# Patient Record
Sex: FEMALE | Race: BLACK OR AFRICAN AMERICAN | Employment: FULL TIME | URBAN - METROPOLITAN AREA
[De-identification: names, ages, dates, MRNs, and addresses within clinical notes are randomized per-mention and may not be internally consistent; named-entity substitution may affect disease eponyms.]

---

## 2017-04-21 ENCOUNTER — HOSPITAL ENCOUNTER (OUTPATIENT)
Dept: NUCLEAR MEDICINE | Age: 37
Discharge: HOME | End: 2017-04-21
Attending: INTERNAL MEDICINE
Payer: COMMERCIAL

## 2017-04-21 DIAGNOSIS — K59.00 CONSTIPATION: ICD-10-CM

## 2017-04-21 DIAGNOSIS — R10.9 ABDOMINAL PAIN: ICD-10-CM

## 2017-04-21 DIAGNOSIS — R19.07 ABDOMINAL SWELLING, GENERALIZED: ICD-10-CM

## 2017-04-21 DIAGNOSIS — R11.0 NAUSEA: ICD-10-CM

## 2017-04-21 DIAGNOSIS — R68.81 EARLY SATIETY: ICD-10-CM

## 2017-04-21 PROCEDURE — 78264 GASTRIC EMPTYING IMG STUDY: CPT

## 2019-02-15 ENCOUNTER — OFFICE VISIT (OUTPATIENT)
Dept: FAMILY MEDICINE CLINIC | Age: 39
End: 2019-02-15

## 2019-02-15 VITALS
DIASTOLIC BLOOD PRESSURE: 66 MMHG | WEIGHT: 141.8 LBS | OXYGEN SATURATION: 99 % | HEIGHT: 63 IN | SYSTOLIC BLOOD PRESSURE: 122 MMHG | HEART RATE: 68 BPM | BODY MASS INDEX: 25.12 KG/M2 | TEMPERATURE: 98.3 F | RESPIRATION RATE: 16 BRPM

## 2019-02-15 DIAGNOSIS — R51.9 HEADACHE DISORDER: Primary | ICD-10-CM

## 2019-02-15 DIAGNOSIS — R53.83 TIRED: ICD-10-CM

## 2019-02-15 DIAGNOSIS — R10.13 EPIGASTRIC PAIN: ICD-10-CM

## 2019-02-15 RX ORDER — FAMOTIDINE 20 MG/1
20 TABLET, FILM COATED ORAL 2 TIMES DAILY
Qty: 30 TAB | Refills: 1 | Status: SHIPPED | OUTPATIENT
Start: 2019-02-15 | End: 2019-03-07

## 2019-02-15 RX ORDER — SUMATRIPTAN 100 MG/1
100 TABLET, FILM COATED ORAL
Qty: 10 TAB | Refills: 2 | Status: SHIPPED | OUTPATIENT
Start: 2019-02-15 | End: 2019-02-15

## 2019-02-15 RX ORDER — FEXOFENADINE HCL 60 MG
60 TABLET ORAL DAILY
COMMUNITY
End: 2020-01-07

## 2019-02-15 RX ORDER — ONDANSETRON HYDROCHLORIDE 8 MG/1
8 TABLET, FILM COATED ORAL
Qty: 20 TAB | Refills: 0 | Status: SHIPPED | OUTPATIENT
Start: 2019-02-15 | End: 2021-10-27

## 2019-02-15 NOTE — PROGRESS NOTES
Chief Complaint   Patient presents with    Headache     Every day for the past 4 weeks, have been taking ibuprofen , but that has not help at all.  Nausea     happened with a severe migraine last week Friday.  Decreased Appetite     Lately, due to head pain. 1. Have you been to the ER, urgent care clinic since your last visit? Hospitalized since your last visit? No    2. Have you seen or consulted any other health care providers outside of the [address] [place] [address] since your last visit? Include any pap smears or colon screening.  Yes to

## 2019-02-15 NOTE — PROGRESS NOTES
HISTORY OF PRESENT ILLNESS  Alber Cedeño is a 45 y.o. female. HPI: Patient is complaining of headache x 3-4 weeks. It is associated with nausea and loss of appetite and fatigue. She has histroy of migraine headaches and chronic fatigue. She has been taking OTC motrin without help. She is also complaining of epigastric pain x 1 month, she describes pain as dull , doesn't know contributing or relieving factors. [facility] 3/10   Past Medical History:   Diagnosis Date    Abnormal Pap smear     2003-cryo, normal since    Cervical radiculitis, LUE Paresthesias and Pain 4/6/2016    Ortho Dr Maryjo Harding 2015, Pain Mgt Dr Ana Tineo , PT    Cervicalgia 4/6/2016    Ortho Dr Maryjo Harding 2015, Pain Mgt Dr Ana Tineo , PT    Chronic fatigue 7/7/2015    Since summer 2013, sleep eval 2014 negative    Chronic nausea 4/6/2016    GI Dr Quinones No: EGD and Colonoscopy 2-2016    Headache     Headache(784.0)     Mechanical back pain 5/21/2014    Since age 11 yo chronic LBP; Eval since [id number]: Dr Rina Cabello: plain films, MRI's, EMG studies, Neuro consult, PT on and off, dry needling 3/2014    Periodic limb movement disorder 7/7/2015    Sleep eval Dr Kacie Rao, 7/2014     Past Surgical History:   Procedure Laterality Date    HX BLADDER SUSPENSION  3/23/15    prolapse    HX PARTIAL HYSTERECTOMY  3/23/15    prolapse; cervix and ovaries intact     Allergies   Allergen Reactions    Bactrim [Sulfamethoprim Ds] Swelling     Swollen tongue    Codeine Rash   '  Current Outpatient Medications:     fexofenadine (ALLEGRA) 60 mg tablet, Take  by mouth., Disp: , Rfl:     OTHER, Allergy injection every 2 weeks. , Disp: , Rfl:     SUMAtriptan (IMITREX) 100 mg tablet, Take 1 Tab by mouth once as needed for Migraine for up to 1 dose., Disp: 10 Tab, Rfl: 2    ondansetron hcl (ZOFRAN) 8 mg tablet, Take 1 Tab by mouth every eight (8) hours as needed for Nausea., Disp: 20 Tab, Rfl: 0    famotidine (PEPCID) 20 mg tablet, Take 1 Tab by mouth two (2) times a day., Disp: 30 Tab, Rfl: 1    MULTIVIT WITH CALCIUM,IRON,MIN (WOMEN'S DAILY MULTIVITAMIN PO), Take  by mouth., Disp: , Rfl:     methylPREDNISolone (MEDROL DOSEPACK) 4 mg tablet, Recently per [facility] for allergic reaction, Disp: , Rfl:     loratadine (CLARITIN) 10 mg tablet, Take 10 mg by mouth daily as needed for Allergies. , Disp: , Rfl:   Review of Systems   Constitutional: Negative. Eyes: Negative. Respiratory: Negative. Cardiovascular: Negative. Gastrointestinal: Positive for heartburn and nausea. Neurological: Positive for headaches. Blood pressure 122/66, pulse 68, temperature 98.3 °F (36.8 °C), temperature source Oral, resp. rate 16, height 5' 3\" (1.6 m), weight 141 lb 12.8 oz (64.3 kg), last menstrual period 02/03/2015, SpO2 99 %. Physical Exam   Constitutional: She is oriented to person, place, and time. No distress. HENT:   Mouth/Throat: Oropharynx is clear and moist.   Neck: Normal range of motion. Neck supple. Cardiovascular: Normal rate and regular rhythm. No murmur heard. Pulmonary/Chest: Effort normal and breath sounds normal.   Abdominal: Soft. Bowel sounds are normal.   Neurological: She is alert and oriented to person, place, and time. She has normal reflexes. Nursing note and vitals reviewed. ASSESSMENT and PLAN  Diagnoses and all orders for this visit:    1. Headache disorder  -     SUMAtriptan (IMITREX) 100 mg tablet; Take 1 Tab by mouth once as needed for Migraine for up to 1 dose. -     ondansetron hcl (ZOFRAN) 8 mg tablet; Take 1 Tab by mouth every eight (8) hours as needed for Nausea. 2. Epigastric pain  -     famotidine (PEPCID) 20 mg tablet; Take 1 Tab by mouth two (2) times a day.     3. Tired  -     METABOLIC PANEL, BASIC  -     CBC W/O DIFF  -     T4, FREE  -     TSH 3RD GENERATION  Pt was given an after visit summary which includes diagnosis, current medicines and vital and voiced understanding of treatment plan

## 2019-02-16 LAB
BUN SERPL-MCNC: 12 MG/DL (ref 6–20)
BUN/CREAT SERPL: 14 (ref 9–23)
CALCIUM SERPL-MCNC: 8.8 MG/DL (ref 8.7–10.2)
CHLORIDE SERPL-SCNC: 105 MMOL/L (ref 96–106)
CO2 SERPL-SCNC: 22 MMOL/L (ref 20–29)
CREAT SERPL-MCNC: 0.83 MG/DL (ref 0.57–1)
ERYTHROCYTE [DISTWIDTH] IN BLOOD BY AUTOMATED COUNT: 14.9 % (ref 12.3–15.4)
GLUCOSE SERPL-MCNC: 75 MG/DL (ref 65–99)
HCT VFR BLD AUTO: 37.4 % (ref 34–46.6)
HGB BLD-MCNC: 12.4 G/DL (ref 11.1–15.9)
MCH RBC QN AUTO: 27 PG (ref 26.6–33)
MCHC RBC AUTO-ENTMCNC: 33.2 G/DL (ref 31.5–35.7)
MCV RBC AUTO: 82 FL (ref 79–97)
PLATELET # BLD AUTO: 206 X10E3/UL (ref 150–379)
POTASSIUM SERPL-SCNC: 4.4 MMOL/L (ref 3.5–5.2)
RBC # BLD AUTO: 4.59 X10E6/UL (ref 3.77–5.28)
SODIUM SERPL-SCNC: 140 MMOL/L (ref 134–144)
T4 FREE SERPL-MCNC: 1.12 NG/DL (ref 0.82–1.77)
TSH SERPL DL<=0.005 MIU/L-ACNC: 1.59 UIU/ML (ref 0.45–4.5)
WBC # BLD AUTO: 3.6 X10E3/UL (ref 3.4–10.8)

## 2019-02-18 ENCOUNTER — TELEPHONE (OUTPATIENT)
Dept: FAMILY MEDICINE CLINIC | Age: 39
End: 2019-02-18

## 2019-02-18 NOTE — TELEPHONE ENCOUNTER
USMAN Cervantes LPN   Caller: Unspecified (Today, 12:01 PM)             I did prescribed Imitrex but I doesn't see on chart either   I changed it to Excedrin migraine she can take 1 tab as needed to headaches

## 2019-03-07 ENCOUNTER — OFFICE VISIT (OUTPATIENT)
Dept: FAMILY MEDICINE CLINIC | Age: 39
End: 2019-03-07

## 2019-03-07 VITALS
SYSTOLIC BLOOD PRESSURE: 104 MMHG | OXYGEN SATURATION: 97 % | WEIGHT: 139.2 LBS | BODY MASS INDEX: 24.66 KG/M2 | DIASTOLIC BLOOD PRESSURE: 62 MMHG | HEIGHT: 63 IN | HEART RATE: 72 BPM | TEMPERATURE: 98 F | RESPIRATION RATE: 18 BRPM

## 2019-03-07 DIAGNOSIS — Z00.00 ROUTINE GENERAL MEDICAL EXAMINATION AT A HEALTH CARE FACILITY: Primary | ICD-10-CM

## 2019-03-07 PROBLEM — M26.629 TMJ SYNDROME: Status: ACTIVE | Noted: 2019-02-01

## 2019-03-07 PROBLEM — Z91.018 MULTIPLE FOOD ALLERGIES: Status: ACTIVE | Noted: 2019-03-07

## 2019-03-07 PROBLEM — Z91.09 MULTIPLE ENVIRONMENTAL ALLERGIES: Status: ACTIVE | Noted: 2019-03-07

## 2019-03-07 RX ORDER — EPINEPHRINE 0.3 MG/.3ML
0.3 INJECTION SUBCUTANEOUS
COMMUNITY
End: 2021-10-27

## 2019-03-07 NOTE — PROGRESS NOTES
Chief Complaint   Patient presents with    Complete Physical     not fasting - has gyn        1. Have you been to the ER, urgent care clinic since your last visit? Hospitalized since your last visit? No    2. Have you seen or consulted any other health care providers outside of the [address] [place] [address] since your last visit? Include any pap smears or colon screening.    Yes [facility]

## 2019-03-07 NOTE — PROGRESS NOTES
Chief Complaint   Patient presents with    Complete Physical     not fasting - has gyn        HISTORY OF PRESENT ILLNESS   HPI  Patient presents for general checkup. I have not seen her for a few years but she has been to [facility] for various acute, mild illnesses and the past few months has been seeing her allergist, dentist and an ENT for multiple issues. She has h/o multiple environmental and food allergies and started on allergy shots about 2 yrs ago. She also has been told by her dentist over the years that she \"probably has TMJ\", but states that dx was finally confirmed recently and they are in the process of custom made mouth guard. She also recently saw an ENT about nasal issues/TMJ/headaches and they prescribed a steroid nasal spray, recommended Topamax for the chronic headaches and referred her to Neurology since she had not seen one in a while. She is scheduled to see Rachele Adames next month. She will be starting on the spray and Topamax until then. She states aside from the TMJ and chronic migraine headaches she had been feeling great and like she has been in good health in general. She is very happy socially/emotionally. Enjoys being w/ her  and 2 young children as well. REVIEW OF SYMPTOMS     Review of Systems   Constitutional: Negative. Eyes: Negative. Respiratory: Negative. Cardiovascular: Negative. Gastrointestinal: Negative. Genitourinary: Negative.     Psychiatric/Behavioral: Negative.            PROBLEM LIST/MEDICAL HISTORY      Problem List  Date Reviewed: 3/7/2019          Codes Class Noted    Multiple food allergies ICD-10-CM: Z91.018  ICD-9-CM: V15.05  3/7/2019    Overview Signed 3/7/2019  2:58 PM by MD Chasity Garcia Dr; coconut, seafood, corn, several fruits             Multiple environmental allergies ICD-10-CM: Z91.09  ICD-9-CM: V15.09  3/7/2019    Overview Signed 3/7/2019  2:58 PM by Malachi Umaña MD Chasity Pritchett, Immunotherapy since ~ 2017             TMJ syndrome ICD-10-CM: L22.229  ICD-9-CM: 524.69  2/1/2019    Overview Addendum 3/7/2019  3:04 PM by Malachi Umaña MD     [phone] Dr Dimitrios Freeman; Dentist custom mouth guard [phone]             Irritable bowel syndrome with constipation ICD-10-CM: K58.9  ICD-9-CM: 564.1  4/6/2016    Overview Addendum 4/6/2016  8:33 PM by Malachi Umaña MD     GI Dr Joslyn Leyden: EGD and Colonoscopy 2-2016             Cervicalgia ICD-10-CM: M54.2  ICD-9-CM: 723.1  4/6/2016    Overview Addendum 4/6/2016  8:56 PM by Malachi Umaña MD     Ortho Dr Simona Mcgregor 2015, Pain Mgt Dr Mike Recinos , PT; Rheum Dr Pretty Shaffer [phone]             Cervical radiculitis, LUE Paresthesias and Pain ICD-10-CM: M54.12  ICD-9-CM: 723.4  4/6/2016    Overview Signed 4/6/2016  8:39 PM by Malachi Umaña MD     Ortho Dr Simona Mcgregor 2015, Pain Mgt Dr Mike Recinos , PT             Chronic nausea ICD-10-CM: R11.0  ICD-9-CM: 787.02  4/6/2016    Overview Signed 4/6/2016  8:56 PM by Malachi Umaña MD     GI Dr Joslyn Leyden: EGD and Colonoscopy 2-2016             Recurrent Syncope ICD-10-CM: R55  ICD-9-CM: 780.2  7/7/2015    Overview Signed 7/7/2015  1:02 PM by Malachi Umaña MD     ENT, Cardiologist, Neurologist, Sleep consult: [phone] eval all negative; did vestibular therapy at [address] [place] [address]             Chronic fatigue ICD-10-CM: R53.82  ICD-9-CM: 780.79  7/7/2015    Overview Signed 7/7/2015  1:03 PM by Malachi Umaña MD     Since summer 2013, sleep eval 2014 negative             Recurrent Syncope and Presyncope ICD-10-CM: R55  ICD-9-CM: 780.2  7/7/2015        Periodic limb movement disorder ICD-10-CM: G47.61  ICD-9-CM: 327.51  7/7/2015    Overview Signed 7/7/2015  1:44 PM by Malachi Umaña MD     Sleep eval Dr Yohan Agarwal, 7/2014             Vertigo ICD-10-CM: R42  ICD-9-CM: 780.4  11/25/2014    Overview Addendum 7/7/2015  1:39 PM by Vandana Rodriguez MD     ENT Dr Serita Osler , Cardiologist Dr Guy Goode, Neurologist Dr Homa Handley, Sleep consult: [phone] eval all negative; did vestibular therapy at [address] [place] [address] at . [address]: Z70.291  ICD-9-CM: 346.90  11/25/2014    Overview Addendum 3/7/2019  3:07 PM by Vandana Rodriguez MD     Chronic and Vestibular; Neuro Dr Homa Handley; MRI Brain ; New referral 3-2019 Dr Guido Monson             Mechanical back pain ICD-10-CM: M54.9  ICD-9-CM: 724.5  5/21/2014    Overview Addendum 4/6/2016  1:12 PM by Vandana Rodriguez MD     Upper and lower back; Since age 11 yo chronic LBP; Eval since 1474-8925: Dr Candelaria Hickman: plain films, MRI's, EMG studies, Neuro consult, PT on and off, dry needling 3/2014, Chiro 9/2015 x months             Uterine prolapse ICD-10-CM: N81.4  ICD-9-CM: 618.1  6/28/2010        AR (allergic rhinitis) ICD-10-CM: J30.9  ICD-9-CM: 477.9  6/28/2010    Overview Signed 4/6/2016  8:40 PM by Vandana Rodriguez MD     [facility] eval [phone]                       PAST SURGICAL HISTORY       Past Surgical History:   Procedure Laterality Date    HX BLADDER SUSPENSION  3/23/15    prolapse    HX GYN  2003    Cryotherapy for abnormal pap    HX PARTIAL HYSTERECTOMY  3/23/15    prolapse; cervix and ovaries intact         MEDICATIONS      Current Outpatient Medications   Medication Sig    EPINEPHrine (EPIPEN) 0.3 mg/0.3 mL injection 0.3 mg by IntraMUSCular route once as needed.  aspirin-acetaminophen-caffeine (EXCEDRIN MIGRAINE) 250-250-65 mg per tablet Take 1 tab as needed for headache    fexofenadine (ALLEGRA) 60 mg tablet Take 60 mg by mouth daily.  OTHER Allergy injection every 2X week    ondansetron hcl (ZOFRAN) 8 mg tablet Take 1 Tab by mouth every eight (8) hours as needed for Nausea.  MULTIVIT WITH CALCIUM,IRON,MIN (WOMEN'S DAILY MULTIVITAMIN PO) Take  by mouth.      No current facility-administered medications for this visit.            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         IMMUNIZATIONS  Immunization History   Administered Date(s) Administered    Influenza Vaccine Split 11/17/2011         FAMILY HISTORY     Family History   Problem Relation Age of Onset    Asthma Mother     Asthma Brother     Asthma Son     Allergic Rhinitis Son     Cancer Maternal Grandmother     Diabetes Maternal Grandmother     Hypertension Maternal Grandmother     Elevated Lipids Maternal Grandmother     Allergic Rhinitis Daughter         food and environmental allergies         VITALS     Visit Vitals  /62 (BP 1 Location: Left arm, BP Patient Position: Sitting)   Pulse 72   Temp 98 °F (36.7 °C) (Oral)   Resp 18   Ht 5' 3\" (1.6 m)   Wt 139 lb 3.2 oz (63.1 kg)   LMP 02/03/2015   SpO2 97%   BMI 24.66 kg/m²          PHYSICAL EXAMINATION     Physical Exam   Constitutional: She is oriented to person, place, and time and well-developed, well-nourished, and in no distress. HENT:   Right Ear: Tympanic membrane normal.   Left Ear: Tympanic membrane normal.   Mouth/Throat: Oropharynx is clear and moist. No oropharyngeal exudate. Eyes: Conjunctivae and EOM are normal. Pupils are equal, round, and reactive to light. Neck: Neck supple. Cardiovascular: Normal rate, regular rhythm and normal heart sounds. Pulmonary/Chest: Effort normal and breath sounds normal.   Abdominal: Soft. Bowel sounds are normal. She exhibits no distension and no mass. There is no tenderness. Musculoskeletal: Normal range of motion. She exhibits no edema or tenderness. Lymphadenopathy:     She has no cervical adenopathy. Neurological: She is alert and oriented to person, place, and time. Gait normal.   Skin: Skin is warm. Psychiatric: Mood and affect normal.   Vitals reviewed.          DIAGNOSTIC DATA         LABORATORY DATA     Results for orders placed or performed in visit on 91/02/40   METABOLIC PANEL, BASIC   Result Value Ref Range    Glucose 75 65 - 99 mg/dL    BUN 12 6 - 20 mg/dL    Creatinine 0.83 0.57 - 1.00 mg/dL    GFR est non-AA 90 >59 mL/min/1.73    GFR est  >59 mL/min/1.73    BUN/Creatinine ratio 14 9 - 23    Sodium 140 134 - 144 mmol/L    Potassium 4.4 3.5 - 5.2 mmol/L    Chloride 105 96 - 106 mmol/L    CO2 22 20 - 29 mmol/L    Calcium 8.8 8.7 - 10.2 mg/dL   CBC W/O DIFF   Result Value Ref Range    WBC 3.6 3.4 - 10.8 x10E3/uL    RBC 4.59 3.77 - 5.28 x10E6/uL    HGB 12.4 11.1 - 15.9 g/dL    HCT 37.4 34.0 - 46.6 %    MCV 82 79 - 97 fL    MCH 27.0 26.6 - 33.0 pg    MCHC 33.2 31.5 - 35.7 g/dL    RDW 14.9 12.3 - 15.4 %    PLATELET 177 719 - 856 x10E3/uL   T4, FREE   Result Value Ref Range    T4, Free 1.12 0.82 - 1.77 ng/dL   TSH 3RD GENERATION   Result Value Ref Range    TSH 1.590 0.450 - 4.500 uIU/mL            ASSESSMENT & PLAN       ICD-10-CM ICD-9-CM    1.  Routine general medical examination at a health care facility Z00.00 V70.0      Reviewed diet, nutrition, exercise, weight management, BMI/goals, age/risk based screening recommendations, health maintenance & prevention counseling. Cancer screening USPTFS guidelines reviewed w/ pt today. Discussed benefits/positive/negative outcomes of screening based on age/risk stratification. Informed consent for/against screening based on pt's personal hx/risk factors. Updated in history above and health maintenance. Sees gyn annually for well woman visits/gyn exams. States she had a normal pap ~ 9-2018. She will continue routine follow up w/ her allergist and is scheduled to see Neurologist Dr. Kenzie Carrasco next month about her chronic migraine headaches.  RTC here in the interim prn.

## 2019-03-07 NOTE — PATIENT INSTRUCTIONS

## 2019-03-07 NOTE — LETTER
March 7, 2019 Sergey CAST[place] [address] Dear Yokasta Huang: Thank you for requesting access to Q-Bot. Please follow the instructions below to securely access and download your online medical record. Q-Bot allows you to send messages to your doctor, view your test results, renew your prescriptions, schedule appointments, and more. How Do I Sign Up? 1. In your internet browser, go to [email or web address]. 2. Click on the First Time User? Click Here link in the Sign In box. You will see the New Member Sign Up page. 3. Enter your Q-Bot Access Code exactly as it appears below. You will not need to use this code after youve completed the sign-up process. If you do not sign up before the expiration date, you must request a new code. Q-Bot Access Code: Activation code not generated Current Q-Bot Status: Active 4. Enter the last four digits of your Social Security Number (xxxx) and Date of Birth (mm/dd/yyyy) as indicated and click Submit. You will be taken to the next sign-up page. 5. Create a Q-Bot ID. This will be your Q-Bot login ID and cannot be changed, so think of one that is secure and easy to remember. 6. Create a Q-Bot password. You can change your password at any time. 7. Enter your Password Reset Question and Answer. This can be used at a later time if you forget your password. 8. Enter your e-mail address. You will receive e-mail notification when new information is available in [address] 9. Click Sign Up. You can now view and download portions of your medical record. 10. Click the Download Summary menu link to download a portable copy of your medical information. Additional Information If you have questions, please visit the Frequently Asked Questions section of the Q-Bot website at [email or web address] Remember, Q-Bot is NOT to be used for urgent needs. For medical emergencies, dial 911. Now available from your iPhone and Android! Sincerely, [employer]

## 2020-01-03 ENCOUNTER — TELEPHONE (OUTPATIENT)
Dept: FAMILY MEDICINE CLINIC | Age: 40
End: 2020-01-03

## 2020-01-03 NOTE — TELEPHONE ENCOUNTER
----- Message from Meek Nash sent at 1/3/2020  1:13 PM EST -----  Regarding: Dr. Mahmood Daunt: [phone]  Caller's first and last name and relationship (if not the patient):  Best contact number(s): [phone]  What are the symptoms: Chest pain  Transfer successful - yes/no (include outcome): Yes  Transfer declined - yes/no (include reason): No  Was caller advised to seek appropriate level of care - yes/no: No   Details to clarify the request: Pt advised her Bronchitis has  been going on for 7 weeks as well as her chest pain     Please Advise

## 2020-01-03 NOTE — TELEPHONE ENCOUNTER
Called pt ans she states that she jusst called to make an appt and she doesn't understand why I needed to call her back. Explained that they sent it to me back she had c/o of chest pain. Pt states that she is coming in b/c she has been sick off and on the last 7 weeks. She has noticed that she sometimes get SOB if she talks to much or long. [facility]. She remembers that she went to [facility].  I have called to get their note and she was seen in Dec.  The time before that was April. I let her know to keep appt as planned for the 7th. But between now ans then she needs to go to ER if SOB, CP radiating down arm or up to jaw. Pt said she doesn't think that it is anything but wants to be checked by her PCP.

## 2020-01-07 ENCOUNTER — OFFICE VISIT (OUTPATIENT)
Dept: FAMILY MEDICINE CLINIC | Age: 40
End: 2020-01-07

## 2020-01-07 VITALS
OXYGEN SATURATION: 98 % | HEART RATE: 71 BPM | HEIGHT: 63 IN | DIASTOLIC BLOOD PRESSURE: 58 MMHG | RESPIRATION RATE: 16 BRPM | TEMPERATURE: 98.1 F | SYSTOLIC BLOOD PRESSURE: 118 MMHG | WEIGHT: 146.2 LBS | BODY MASS INDEX: 25.91 KG/M2

## 2020-01-07 DIAGNOSIS — J45.40 MODERATE PERSISTENT REACTIVE AIRWAY DISEASE WITHOUT COMPLICATION: ICD-10-CM

## 2020-01-07 DIAGNOSIS — Z91.09 MULTIPLE ENVIRONMENTAL ALLERGIES: ICD-10-CM

## 2020-01-07 DIAGNOSIS — R07.89 CHEST WALL PAIN: Primary | ICD-10-CM

## 2020-01-07 RX ORDER — BUDESONIDE AND FORMOTEROL FUMARATE DIHYDRATE 80; 4.5 UG/1; UG/1
2 AEROSOL RESPIRATORY (INHALATION) 2 TIMES DAILY
Qty: 1 INHALER | Refills: 1 | Status: SHIPPED | OUTPATIENT
Start: 2020-01-07 | End: 2020-03-24

## 2020-01-07 RX ORDER — MINERAL OIL
180 ENEMA (ML) RECTAL DAILY
COMMUNITY

## 2020-01-07 NOTE — PATIENT INSTRUCTIONS
Musculoskeletal Chest Pain: Care Instructions  Your Care Instructions    Chest pain is not always a sign that something is wrong with your heart or that you have another serious problem. The doctor thinks your chest pain is caused by strained muscles or ligaments, inflamed chest cartilage, or another problem in your chest, rather than by your heart. You may need more tests to find the cause of your chest pain. Follow-up care is a key part of your treatment and safety. Be sure to make and go to all appointments, and call your doctor if you are having problems. It's also a good idea to know your test results and keep a list of the medicines you take. How can you care for yourself at home? · Take pain medicines exactly as directed. ? If the doctor gave you a prescription medicine for pain, take it as prescribed. ? If you are not taking a prescription pain medicine, ask your doctor if you can take an over-the-counter medicine. · Rest and protect the sore area. · Stop, change, or take a break from any activity that may be causing your pain or soreness. · Put ice or a cold pack on the sore area for 10 to 20 minutes at a time. Try to do this every 1 to 2 hours for the next 3 days (when you are awake) or until the swelling goes down. Put a thin cloth between the ice and your skin. · After 2 or 3 days, apply a heating pad set on low or a warm cloth to the area that hurts. Some doctors suggest that you go back and forth between hot and cold. · Do not wrap or tape your ribs for support. This may cause you to take smaller breaths, which could increase your risk of lung problems. · Mentholated creams such as Bengay or Icy Hot may soothe sore muscles. Follow the instructions on the package. · Follow your doctor's instructions for exercising. · Gentle stretching and massage may help you get better faster. Stretch slowly to the point just before pain begins, and hold the stretch for at least 15 to 30 seconds.  Do this 3 or 4 times a day. Stretch just after you have applied heat. · As your pain gets better, slowly return to your normal activities. Any increased pain may be a sign that you need to rest a while longer. When should you call for help? Call 911 anytime you think you may need emergency care. For example, call if:    · You have chest pain or pressure. This may occur with:  ? Sweating. ? Shortness of breath. ? Nausea or vomiting. ? Pain that spreads from the chest to the neck, jaw, or one or both shoulders or arms. ? Dizziness or lightheadedness. ? A fast or uneven pulse. After calling 911, chew 1 adult-strength aspirin. Wait for an ambulance. Do not try to drive yourself.     · You have sudden chest pain and shortness of breath, or you cough up blood.    Call your doctor now or seek immediate medical care if:    · You have any trouble breathing.     · Your chest pain gets worse.     · Your chest pain occurs consistently with exercise and is relieved by rest.    Watch closely for changes in your health, and be sure to contact your doctor if:    · Your chest pain does not get better after 1 week. Where can you learn more? Go to http://yelitza-lino.info/. Enter V293 in the search box to learn more about \"Musculoskeletal Chest Pain: Care Instructions. \"  Current as of: June 26, 2019  Content Version: 12.2  © 3589-9172 [employer]wise, [employer]. Care instructions adapted under license by [employer] (which disclaims liability or warranty for this information). If you have questions about a medical condition or this instruction, always ask your healthcare professional. [place] [address] any warranty or liability for your use of this information.

## 2020-01-07 NOTE — PROGRESS NOTES
Chief Complaint   Patient presents with    Chest Pain     off and on for 7 weeks, hurts when talking and certain movement, laying down, sitting     1. Have you been to the ER, urgent care clinic since your last visit? Hospitalized since your last visit? Yes Where: [facility]    2. Have you seen or consulted any other health care providers outside of the [address] [place] [address] since your last visit? Include any pap smears or colon screening.  Yes Where: gyn Dr Isela Roque 11/19/19

## 2020-01-07 NOTE — PROGRESS NOTES
Chief Complaint   Patient presents with    Chest Pain     soreness off and on for 7 weeks ever since diagnosed w/ prolonged bronchitis, coughed for weeks, hurts when talking alot and certain movement, changing positions    Cough     follow up [facility] 12/1/19 and [facility] 12/6/19       HISTORY OF PRESENT ILLNESS   HPI  Patient presents for follow up after 2 recent urgent care visits to outside facilities for URI/Sinus infections/Bronchitis. She states that she is prone toward any or all of these at least q2-3 months, \"[facility] and I have been this way all my life since childhood always being prone towards ear infections, sinus issues, allergies or bronchitis\". Most recently she started end of November w/ uri symptoms and because her daughter had been diagnosed w/ a sinus infection and patient was to travel out of town she decided to go to [facility] the following week. Reviewed note from [facility] [place] 12/1/19:  4 day h/o sinus congestion, drainage, sore throat, cough (pt states non productive at that time) and no fevers, sob or wheeze. Strep test negative. /77, HR 78, RR 16, T 98.9, sats 100% room air. Lung exam clear. Diagnosed viral URI. Prescribed Prednisone taper. She started feeling better as the week went on. Went on w/ her travel plans and flew to [facility] on 12/6. Shortly after being there and while out at dinner w/ friends she started not feeling well again. She progressively developed sinus/chest congestion, and started w/ a hacking productive cough this time. She started feeling tightness in her chest w/ wheezing and some labored breathing. Her friend who is a nurse took her to the local urgent care. Patient states that a CXR was done and ok. She was given a \"shot of an abx in one buttocks and Decadron in the other\". She was dc'd w rx's for both an abx (Augmentin) and another taper of oral prednisone.   By later that night she had already started feeling remarkably better. She completed full course of the abx and prednisone and is now doing much much better. She still has a slightly lingering dry cough sometimes at night or if taking in a deep breath or if talking too much. Her chest muscles have felt very sore, achy, tender and are aggravated after talking a lot, rolling over in bed or changing positions. She denies any further wheezing or sob. No sputum, fevers, chills, sweats. She denies asthma hx but states her brother and son have asthma. Both her children have environmental allergies. Patient has multiple environmental allergies as well and is followed by Dr. Verito Nelson at [facility]. She does allergy shots twice a week. She denies prior asthma testing or dx. She was prescribed an Albuterol inhaler in her 20's but not since. She is familiar w/ the inhalers however bc of her son who is now 15 yo and sees a pulmonologist for his asthma. There is no asthma/allergy hx on her 's side of the family. REVIEW OF SYMPTOMS   Review of Systems   Constitutional: Negative. Negative for chills and fever. HENT: Negative for ear pain, sinus pain and sore throat. Eyes: Negative. Respiratory: Positive for cough. Negative for hemoptysis and sputum production. Cardiovascular: Negative for palpitations. Gastrointestinal: Negative. Neurological: Negative.             PROBLEM LIST/MEDICAL HISTORY     Problem List  Date Reviewed: 1/7/2020          Codes Class Noted    Multiple food allergies ICD-10-CM: Z91.018  ICD-9-CM: V15.05  3/7/2019    Overview Signed 3/7/2019  2:58 PM by Corie Curtis MD     [facility] Dr Verito Nelson; coconut, seafood, corn, several fruits             Multiple environmental allergies ICD-10-CM: Z91.09  ICD-9-CM: V15.09  3/7/2019    Overview Signed 3/7/2019  2:58 PM by Corie Curtis MD     [facility] Dr Verito Nelson, Immunotherapy since ~ 2017             TMJ syndrome ICD-10-CM: D06.176  ICD-9-CM: 524.69  2/1/2019    Overview Addendum 3/7/2019  3:04 PM by Zuhair Walter MD     [phone] Rey Ta; Dentist custom mouth guard [phone]             Irritable bowel syndrome with constipation ICD-10-CM: K58.9  ICD-9-CM: 564.1  4/6/2016    Overview Addendum 4/6/2016  8:33 PM by Zuhair Walter MD     GI Dr Ariadne Cota: EGD and Colonoscopy 2-2016             Cervicalgia ICD-10-CM: M54.2  ICD-9-CM: 723.1  4/6/2016    Overview Addendum 4/6/2016  8:56 PM by Zuhair Walter MD     Ortho Dr Marcello Vizcarra 2015, Pain Mgt Dr Aamir Curtis , PT; Rheum Dr Toni Roe [phone]             Cervical radiculitis, LUE Paresthesias and Pain ICD-10-CM: M54.12  ICD-9-CM: 723.4  4/6/2016    Overview Signed 4/6/2016  8:39 PM by Zuhair Walter MD     Ortho Dr Marcello Vizcarra 2015, Pain Mgt Dr Aamir Curtis , PT             Chronic nausea ICD-10-CM: R11.0  ICD-9-CM: 787.02  4/6/2016    Overview Signed 4/6/2016  8:56 PM by Zuhair Walter MD     GI Dr Ariadne Cota: EGD and Colonoscopy 2-2016             Recurrent Syncope ICD-10-CM: R55  ICD-9-CM: 780.2  7/7/2015    Overview Signed 7/7/2015  1:02 PM by Zuhair Walter MD     ENT, Cardiologist, Neurologist, Sleep consult: [id number] eval all negative; did vestibular therapy at [address] [place] [address] at [facility]             Chronic fatigue ICD-10-CM: R53.82  ICD-9-CM: 780.79  7/7/2015    Overview Signed 7/7/2015  1:03 PM by Zuhair Walter MD     Since summer 2013, sleep eval 2014 negative             Recurrent Syncope and Presyncope ICD-10-CM: R55  ICD-9-CM: 780.2  7/7/2015        Periodic limb movement disorder ICD-10-CM: G47.61  ICD-9-CM: 327.51  7/7/2015    Overview Signed 7/7/2015  1:44 PM by Zuhair Walter MD     Sleep eval Dr Sammy Painting, 7/2014             Vertigo ICD-10-CM: R42  ICD-9-CM: 780.4  11/25/2014    Overview Addendum 7/7/2015  1:39 PM by Zuhair Walter MD     ENT Dr Donna Vaughn , Cardiologist Dr Scott Charles, Neurologist Dr Wendy Alves, Sleep consult: [phone] eval all negative; did vestibular therapy at [address] [place] [address] at . [address]: F05.652  ICD-9-CM: 346.90  11/25/2014    Overview Addendum 3/7/2019  3:07 PM by Tia Lake MD     Chronic and Vestibular; Neuro Dr Wendy Alves; MRI Brain ; New referral 3-2019 Dr Jo Ann Carrillo             Mechanical back pain ICD-10-CM: M54.9  ICD-9-CM: 724.5  5/21/2014    Overview Addendum 4/6/2016  1:12 PM by Tia Lake MD     Upper and lower back; Since age 13 yo chronic LBP; Eval since [id number]: Dr Brent Allen: plain films, MRI's, EMG studies, Neuro consult, PT on and off, dry needling 3/2014, Chiro 9/2015 x months             Uterine prolapse ICD-10-CM: N81.4  ICD-9-CM: 618.1  6/28/2010        AR (allergic rhinitis) ICD-10-CM: J30.9  ICD-9-CM: 477.9  6/28/2010    Overview Signed 4/6/2016  8:40 PM by Tia Lake MD     [place] [facility] eval [phone]                       PAST SURGICAL HISTORY     Past Surgical History:   Procedure Laterality Date    HX BLADDER SUSPENSION  3/23/15    prolapse    HX GYN  2003    Cryotherapy for abnormal pap    HX PARTIAL HYSTERECTOMY  3/23/15    prolapse; cervix and ovaries intact         MEDICATIONS     Current Outpatient Medications   Medication Sig    inulin (FIBER GUMMIES PO) Take  by mouth.  fexofenadine (ALLEGRA) 180 mg tablet Take 180 mg by mouth daily.  allergy injection by SubCUTAneous route. 2 x a week, per Dr. Latia Bowling EPINEPHrine (EPIPEN) 0.3 mg/0.3 mL injection 0.3 mg by IntraMUSCular route once as needed.  aspirin-acetaminophen-caffeine (EXCEDRIN MIGRAINE) 250-250-65 mg per tablet Take 1 tab as needed for headache    ondansetron hcl (ZOFRAN) 8 mg tablet Take 1 Tab by mouth every eight (8) hours as needed for Nausea.  MULTIVIT WITH CALCIUM,IRON,MIN (WOMEN'S DAILY MULTIVITAMIN PO) Take  by mouth daily.      No current facility-administered medications for this visit. 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 IMMUNIZATIONS  Immunization History   Administered Date(s) Administered    Influenza Vaccine Split 11/17/2011         FAMILY HISTORY     Family History   Problem Relation Age of Onset    Asthma Mother     Asthma Brother     Asthma Son     Allergic Rhinitis Son     Cancer Maternal Grandmother     Diabetes Maternal Grandmother     Hypertension Maternal Grandmother     Elevated Lipids Maternal Grandmother     Allergic Rhinitis Daughter         food and environmental allergies         VITALS     Visit Vitals  /58 (BP 1 Location: Left arm, BP Patient Position: Sitting)   Pulse 71   Temp 98.1 °F (36.7 °C) (Oral)   Resp 16   Ht 5' 3\" (1.6 m)   Wt 146 lb 3.2 oz (66.3 kg)   LMP 02/03/2015   SpO2 98%   BMI 25.90 kg/m²          PHYSICAL EXAMINATION   Physical Exam  Vitals signs reviewed. Constitutional:       General: She is not in acute distress. Appearance: Normal appearance. She is not ill-appearing. HENT:      Right Ear: Tympanic membrane normal.      Left Ear: Tympanic membrane normal.      Nose: No rhinorrhea. Mouth/Throat:      Mouth: Mucous membranes are moist.      Pharynx: Oropharynx is clear. No oropharyngeal exudate. Eyes:      Conjunctiva/sclera: Conjunctivae normal.   Neck:      Musculoskeletal: Neck supple. No muscular tenderness. Cardiovascular:      Rate and Rhythm: Normal rate and regular rhythm. Heart sounds: Normal heart sounds. No murmur. No friction rub. No gallop. Pulmonary:      Effort: Pulmonary effort is normal. No respiratory distress. Breath sounds: Normal breath sounds. No wheezing or rales. Chest:      Chest wall: Tenderness (diffuse mild reproducible chest wall muscle tenderness and parasternal intercostal muscle tenderness bilaterally) present. No mass, crepitus or edema. Lymphadenopathy:      Cervical: No cervical adenopathy. Skin:     General: Skin is warm and dry.              DIAGNOSTIC DATA         LABORATORY DATA     Results for orders placed or performed in visit on 35/31/16   METABOLIC PANEL, BASIC   Result Value Ref Range    Glucose 75 65 - 99 mg/dL    BUN 12 6 - 20 mg/dL    Creatinine 0.83 0.57 - 1.00 mg/dL    GFR est non-AA 90 >59 mL/min/1.73    GFR est  >59 mL/min/1.73    BUN/Creatinine ratio 14 9 - 23    Sodium 140 134 - 144 mmol/L    Potassium 4.4 3.5 - 5.2 mmol/L    Chloride 105 96 - 106 mmol/L    CO2 22 20 - 29 mmol/L    Calcium 8.8 8.7 - 10.2 mg/dL   CBC W/O DIFF   Result Value Ref Range    WBC 3.6 3.4 - 10.8 x10E3/uL    RBC 4.59 3.77 - 5.28 x10E6/uL    HGB 12.4 11.1 - 15.9 g/dL    HCT 37.4 34.0 - 46.6 %    MCV 82 79 - 97 fL    MCH 27.0 26.6 - 33.0 pg    MCHC 33.2 31.5 - 35.7 g/dL    RDW 14.9 12.3 - 15.4 %    PLATELET 441 575 - 798 x10E3/uL   T4, FREE   Result Value Ref Range    T4, Free 1.12 0.82 - 1.77 ng/dL TSH 3RD GENERATION   Result Value Ref Range    TSH 1.590 0.450 - 4.500 uIU/mL          ASSESSMENT & PLAN       ICD-10-CM ICD-9-CM    1. Chest wall pain: Inflammatory/muscular s/p recent recurrent bronchitis R07.89 786.52    2. Possible RAD consistent w/ Moderate persistent reactive airway disease without complication V27.84 899.28 budesonide-formoterol (SYMBICORT) 80-4.5 mcg/actuation HFAA   3.  Multiple environmental allergies followed by Dr. Christopher Merritt at [place] [address] on Immunotherapy Z91.09 V15.09 fexofenadine (ALLEGRA) 180 mg tablet      allergy injection     S/P 2 courses of prednisone tapers at [facility] 12/1/19 & 12/6/19  S/P Im Rocephin 12/6/19 and completed 10 day course of oral Augmentin  CXR [facility] 12/6/19 reportedly negative  Infectious symptoms improved  Continue Allegra 180 mg daily and allergy injections twice weekly per her Allergist Dr. Christopher Merritt  Add trial of Symbicort 80-4.5 2 puffs bid and reassess w/ me or Dr. Christopher Merritt in ~ 4 weeks, sooner prn  For the chest wall, warm compresses, gentle massage and continued monitoring  RAD, chest wall counseling at length w/ pt today  Follow up if symptoms persist beyond expectant course, sooner if anything worsens in the interim  ER for any acute changes or concerns

## 2020-01-19 ENCOUNTER — TELEPHONE (OUTPATIENT)
Dept: FAMILY MEDICINE CLINIC | Age: 40
End: 2020-01-19

## 2020-01-21 NOTE — TELEPHONE ENCOUNTER
Great thanks. Glad inhaler doing well. Stay on that through winter and can use in spring as well since the weather changes can affect it as well.   Hopefully it will continue to help all this even more over time

## 2020-02-06 NOTE — TELEPHONE ENCOUNTER
Pt said that she is doing much better. She has been using the inhaler everyday. She did want to let us know that when it is really cod outside and she takes a breathe in that it makes her chest hurt. She has had it her whole life and just wanted to mention it. She works in [place] in [employer] and it happens frequently. Told her I would be in touch if Dr Norris West had anything to add. ambulatory

## 2021-10-27 ENCOUNTER — HOSPITAL ENCOUNTER (EMERGENCY)
Age: 41
Discharge: HOME | End: 2021-10-27
Attending: EMERGENCY MEDICINE

## 2021-10-27 ENCOUNTER — APPOINTMENT (OUTPATIENT)
Dept: CT IMAGING | Age: 41
End: 2021-10-27
Attending: EMERGENCY MEDICINE

## 2021-10-27 ENCOUNTER — TELEPHONE (OUTPATIENT)
Dept: FAMILY MEDICINE CLINIC | Age: 41
End: 2021-10-27

## 2021-10-27 ENCOUNTER — NURSE TRIAGE (OUTPATIENT)
Dept: OTHER | Facility: CLINIC | Age: 41
End: 2021-10-27

## 2021-10-27 VITALS
WEIGHT: 151.68 LBS | RESPIRATION RATE: 16 BRPM | SYSTOLIC BLOOD PRESSURE: 116 MMHG | OXYGEN SATURATION: 98 % | DIASTOLIC BLOOD PRESSURE: 78 MMHG | TEMPERATURE: 98.7 F | HEART RATE: 95 BPM | BODY MASS INDEX: 26.87 KG/M2

## 2021-10-27 DIAGNOSIS — G43.811 OTHER MIGRAINE WITH STATUS MIGRAINOSUS, INTRACTABLE: Primary | ICD-10-CM

## 2021-10-27 LAB
COMMENT, HOLDF: NORMAL
SAMPLES BEING HELD,HOLD: NORMAL

## 2021-10-27 PROCEDURE — 36415 COLL VENOUS BLD VENIPUNCTURE: CPT

## 2021-10-27 PROCEDURE — 96375 TX/PRO/DX INJ NEW DRUG ADDON: CPT

## 2021-10-27 PROCEDURE — 96366 THER/PROPH/DIAG IV INF ADDON: CPT

## 2021-10-27 PROCEDURE — 96365 THER/PROPH/DIAG IV INF INIT: CPT

## 2021-10-27 PROCEDURE — 74011250636 HC RX REV CODE- 250/636: Performed by: EMERGENCY MEDICINE

## 2021-10-27 PROCEDURE — 74011000250 HC RX REV CODE- 250: Performed by: EMERGENCY MEDICINE

## 2021-10-27 PROCEDURE — 70450 CT HEAD/BRAIN W/O DYE: CPT

## 2021-10-27 PROCEDURE — 99284 EMERGENCY DEPT VISIT MOD MDM: CPT

## 2021-10-27 RX ORDER — DIPHENHYDRAMINE HYDROCHLORIDE 50 MG/ML
50 INJECTION, SOLUTION INTRAMUSCULAR; INTRAVENOUS
Status: COMPLETED | OUTPATIENT
Start: 2021-10-27 | End: 2021-10-27

## 2021-10-27 RX ORDER — MAGNESIUM SULFATE HEPTAHYDRATE 40 MG/ML
2 INJECTION, SOLUTION INTRAVENOUS
Status: COMPLETED | OUTPATIENT
Start: 2021-10-27 | End: 2021-10-27

## 2021-10-27 RX ORDER — DEXAMETHASONE SODIUM PHOSPHATE 10 MG/ML
10 INJECTION INTRAMUSCULAR; INTRAVENOUS ONCE
Status: COMPLETED | OUTPATIENT
Start: 2021-10-27 | End: 2021-10-27

## 2021-10-27 RX ORDER — KETOROLAC TROMETHAMINE 30 MG/ML
30 INJECTION, SOLUTION INTRAMUSCULAR; INTRAVENOUS
Status: COMPLETED | OUTPATIENT
Start: 2021-10-27 | End: 2021-10-27

## 2021-10-27 RX ORDER — BUTALBITAL, ACETAMINOPHEN AND CAFFEINE 300; 40; 50 MG/1; MG/1; MG/1
1 CAPSULE ORAL
Qty: 30 CAPSULE | Refills: 0 | Status: SHIPPED | OUTPATIENT
Start: 2021-10-27 | End: 2021-11-01

## 2021-10-27 RX ADMIN — SODIUM CHLORIDE 1000 ML: 9 INJECTION, SOLUTION INTRAVENOUS at 12:48

## 2021-10-27 RX ADMIN — DEXAMETHASONE SODIUM PHOSPHATE 10 MG: 10 INJECTION, SOLUTION INTRAMUSCULAR; INTRAVENOUS at 13:03

## 2021-10-27 RX ADMIN — MAGNESIUM SULFATE HEPTAHYDRATE 2 G: 40 INJECTION, SOLUTION INTRAVENOUS at 13:04

## 2021-10-27 RX ADMIN — DIPHENHYDRAMINE HYDROCHLORIDE 50 MG: 50 INJECTION INTRAMUSCULAR; INTRAVENOUS at 13:03

## 2021-10-27 RX ADMIN — KETOROLAC TROMETHAMINE 30 MG: 30 INJECTION, SOLUTION INTRAMUSCULAR; INTRAVENOUS at 13:03

## 2021-10-27 RX ADMIN — PROCHLORPERAZINE EDISYLATE 10 MG: 5 INJECTION INTRAMUSCULAR; INTRAVENOUS at 13:04

## 2021-10-27 NOTE — ED PROVIDER NOTES
42-year-old female with a history of migraines presents with a chief complaint of headache. Patient reports having a headache for the last 3 to 4 days. She has taken over-the-counter medicines with no relief. She states that the headache is somewhat different as there is now a pressure behind the eyes. She has not seen her neurologist in several years and is not currently taking any migraine prescription medicines. She denies fevers, neck stiffness or other symptoms.            Past Medical History:   Diagnosis Date    Cervical radiculitis, LUE Paresthesias and Pain 4/6/2016    Ortho Dr Donna Hernandez 2015, Pain Mgt Dr Ha Lucas , PT    Cervicalgia 4/6/2016    Ortho Dr Donna Hernandez 2015, Pain Mgt Dr Ha Lucas , PT    Chronic fatigue 7/7/2015    Since summer 2013, sleep eval 2014 negative    Chronic nausea 4/6/2016    GI Dr Irish Zaldivar: EGD and Colonoscopy 2-2016    Mechanical back pain 5/21/2014    Since age 13 yo chronic LBP; Eval since [id number]: Dr Guera Johns: plain films, MRI's, EMG studies, Neuro consult, PT on and off, dry needling 3/2014    Migraines 11/25/2014    Chronic and Vestibular; Neuro Dr Kamar Collins; MRI Brain ; New referral 3-2019 Dr Valeria Capps Multiple environmental allergies 3/7/2019    Manju Rao Loss since ~ 2017    Multiple food allergies 3/7/2019    Manju Mandel; coconut, seafood, corn, several fruits    Periodic limb movement disorder 7/7/2015    Sleep eval Dr Kuldeep Brock, 7/2014    TMJ syndrome 2/1/2019    [facility]       Past Surgical History:   Procedure Laterality Date    HX BLADDER SUSPENSION  3/23/15    prolapse    HX GYN  2003    Cryotherapy for abnormal pap    HX PARTIAL HYSTERECTOMY  3/23/15    prolapse; cervix and ovaries intact         Family History:   Problem Relation Age of Onset    Asthma Mother     Asthma Brother     Asthma Son     Allergic Rhinitis Son     Cancer Maternal Grandmother     Diabetes Maternal Grandmother     Hypertension Maternal Grandmother     Elevated Lipids Maternal Grandmother     Allergic Rhinitis Daughter         food and environmental allergies       Social History     Socioeconomic History    Marital status:      Spouse name: Not on file    Number of children: 2    Years of education: Not on file    Highest education level: Not on file   Occupational History    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[employer], Social Work     Employer: [employer]    Occupation:  at [employer] since 2017   Tobacco Use    Smoking status: Never Smoker    Smokeless tobacco: Never Used   Substance and Sexual Activity    Alcohol use: No    Drug use: No    Sexual activity: Yes     Partners: Male     Birth control/protection: Surgical     Comment: Hysterectomy 2015, -Vasectomy   Other Topics Concern     Service Not Asked    Blood Transfusions Not Asked    Caffeine Concern Not Asked    Occupational Exposure Not Asked    Hobby Hazards Not Asked    Sleep Concern No    Stress Concern Not Asked    Weight Concern Not Asked    Special Diet No    Back Care Not Asked    Exercise No    Bike Helmet Not Asked   [address] Not Asked    Self-Exams Not Asked   Social History Narrative    She lives with her  and 2 kids. Social Determinants of Health     Financial Resource Strain:     Difficulty of Paying Living Expenses:    Food Insecurity:     Worried About Running Out of Food in the Last Year:     [address] in the Last Year:    Transportation Needs:     Lack of Transportation (Medical):      Lack of Transportation (Non-Medical):    Physical Activity:     Days of Exercise per Week:     Minutes of Exercise per Session:    Stress:     Feeling of Stress :    Social Connections:     Frequency of Communication with Friends and Family:     Frequency of Social Gatherings with Friends and Family:     Attends [employer] Services:     Active Member of [address] [place] [id number] or Organizations:     Attends Club or Organization Meetings:     Marital Status:    Intimate Partner Violence:     Fear of Current or Ex-Partner:     Emotionally Abused:     Physically Abused:     Sexually Abused: ALLERGIES: Bactrim [sulfamethoprim ds], Imitrex [sumatriptan], and Codeine    Review of Systems   Constitutional: Negative for fever. HENT: Negative for rhinorrhea. Respiratory: Negative for shortness of breath. Cardiovascular: Negative for chest pain. Gastrointestinal: Negative for abdominal pain. Genitourinary: Negative for dysuria. Musculoskeletal: Negative for back pain. Skin: Negative for wound. Neurological: Positive for headaches. Psychiatric/Behavioral: Negative for confusion. Vitals:    10/27/21 1234   BP: 123/83   Pulse: 88   Resp: 16   Temp: 98.9 °F (37.2 °C)   SpO2: 98%   Weight: 68.8 kg (151 lb 10.8 oz)            Physical Exam  Vitals and nursing note reviewed. Constitutional:       General: She is not in acute distress. Appearance: Normal appearance. She is not ill-appearing, toxic-appearing or diaphoretic. HENT:      Head: Normocephalic and atraumatic. Eyes:      Extraocular Movements: Extraocular movements intact. Cardiovascular:      Rate and Rhythm: Normal rate. Pulses: Normal pulses. Pulmonary:      Effort: Pulmonary effort is normal. No respiratory distress. Abdominal:      General: There is no distension. Musculoskeletal:         General: Normal range of motion. Cervical back: Normal range of motion. Skin:     General: Skin is dry. Neurological:      General: No focal deficit present. Mental Status: She is alert and oriented to person, place, and time.    Psychiatric:         Mood and Affect: Mood normal.          MDM  Number of Diagnoses or Management Options  Other migraine with status migrainosus, intractable  Diagnosis management comments: CT was obtained given the fact that the patient states that this migraine is somewhat different than her normal migraine. CT is unremarkable. Patient treated with migraine cocktail and reports significant improvement in her symptoms. I will prescribe her Fioricet for migraines as an outpatient and recommend that she follow-up with her neurologist.  Discussed my clinical impression(s), any labs and/or radiology results with the patient. I answered any questions and addressed any concerns. Discussed the importance of following up with their primary care physician and/or specialist(s). Discussed signs or symptoms that would warrant return back to the ER for further evaluation. The patient is agreeable with discharge.        Amount and/or Complexity of Data Reviewed  Clinical lab tests: ordered and reviewed  Tests in the radiology section of CPT®: ordered and reviewed           Procedures

## 2021-10-27 NOTE — ED TRIAGE NOTES
Triage: pt c/o constant migraine since Sunday. Pt reports hx of migraines and sometimes will have consecutive migraines. Pt stated this migraine feels different than normal with pressure to forehead. +nausea. Pt had not been able to eat or drink except for today since Sunday. Denies blurred vision. Pt reports unable to answer questions or speak quickly because of migraine.

## 2021-10-27 NOTE — DISCHARGE INSTRUCTIONS
Thank you for allowing us to provide you with medical care today. We realize that you have many choices for your emergency care needs. We thank you for choosing [facility]. Please choose us in the future for any continued health care needs. The exam and treatment you received in the Emergency Department were for an emergent problem and are not intended as complete care. It is important that you follow up with a doctor, nurse practitioner, or physician's assistant for ongoing care. If your symptoms worsen or you do not improve as expected and you are unable to reach your usual health care provider, you should return to the Emergency Department. We are available 24 hours a day. Please make an appointment with your health care provider(s) for follow up of your Emergency Department visit. Take this sheet with you when you go to your follow-up visit.

## 2021-10-27 NOTE — ED NOTES
Pt ambulatory out of ED with discharge instructions and prescriptions in hand given by Dr. Clay Cortez; pt verbalized understanding of discharge paperwork and time allotted for questions. VSS. Pt alert and oriented. Pt to be picked up from ER waiting room by spouse.

## 2021-10-27 NOTE — TELEPHONE ENCOUNTER
Received call from Brandy Chambers at [facility] with Red Flag Complaint. Brief description of triage: migraine, consistent for 4 days. Completely debilitating. Triage indicates for patient to go to [facility] [address] now (or to office with PCP approval). After being on hold with office for over 8 minutes, was notified that no providers were available. Recommended for the patient to be seen at an ED for the severity of her symptoms. Pt. Peri Boogie with plan. Care advice provided, patient verbalizes understanding; denies any other questions or concerns; instructed to call back for any new or worsening symptoms. Attention Provider: Thank you for allowing me to participate in the care of your patient. The patient was connected to triage in response to information provided to the [facility]. Please do not respond through this encounter as the response is not directed to a shared pool. Reason for Disposition   SEVERE headache, states 'worst headache' of life    Answer Assessment - Initial Assessment Questions  1. LOCATION: \"Where does it hurt? \"       Pressure - all over     2. ONSET: \"When did the headache start? \" (Minutes, hours or days)       4 days ago     3. PATTERN: \"Does the pain come and go, or has it been constant since it started? \"      Constant    4. SEVERITY: \"How bad is the pain? \" and \"What does it keep you from doing? \"  (e.g., Scale 1-10; mild, moderate, or severe)    - MILD (1-3): doesn't interfere with normal activities     - MODERATE (4-7): interferes with normal activities or awakens from sleep     - SEVERE (8-10): excruciating pain, unable to do any normal activities         Severe     5. RECURRENT SYMPTOM: \"Have you ever had headaches before? \" If so, ask: \"When was the last time? \" and \"What happened that time? \"       Hx of migraines - seen by neurology     6. CAUSE: \"What do you think is causing the headache? \"      Migraine     7. MIGRAINE: \"Have you been diagnosed with migraine headaches? \" If so, ask: \"Is this headache similar? \"       Yes - but have not had one since 2019    8. HEAD INJURY: \"Has there been any recent injury to the head? \"       Denies     9. OTHER SYMPTOMS: \"Do you have any other symptoms? \" (fever, stiff neck, eye pain, sore throat, cold symptoms)      Nausea, light sensitivity, confusion    10. PREGNANCY: \"Is there any chance you are pregnant? \" \"When was your last menstrual period? \"        Denies    Protocols used: HEADACHE-ADULT-[place]

## 2021-10-27 NOTE — TELEPHONE ENCOUNTER
Jerome Sanchez called to speak with the nurse of pcp. Informed nurse MJ. Told to send encounter. Jerome Sanchez stated that Pt has a headache and she will send Pt to the ER. Please f/u with Pt.

## 2021-11-10 ENCOUNTER — OFFICE VISIT (OUTPATIENT)
Dept: FAMILY MEDICINE CLINIC | Age: 41
End: 2021-11-10
Payer: COMMERCIAL

## 2021-11-10 VITALS
DIASTOLIC BLOOD PRESSURE: 72 MMHG | OXYGEN SATURATION: 99 % | HEIGHT: 63 IN | RESPIRATION RATE: 16 BRPM | WEIGHT: 146.8 LBS | SYSTOLIC BLOOD PRESSURE: 110 MMHG | TEMPERATURE: 99 F | HEART RATE: 77 BPM | BODY MASS INDEX: 26.01 KG/M2

## 2021-11-10 DIAGNOSIS — M26.629 TMJ SYNDROME: ICD-10-CM

## 2021-11-10 DIAGNOSIS — G43.909 MIXED MIGRAINE AND MUSCLE CONTRACTION HEADACHE: ICD-10-CM

## 2021-11-10 DIAGNOSIS — Z09 HOSPITAL DISCHARGE FOLLOW-UP: Primary | ICD-10-CM

## 2021-11-10 DIAGNOSIS — Z23 NEEDS FLU SHOT: ICD-10-CM

## 2021-11-10 DIAGNOSIS — M62.838 MUSCLE SPASMS OF NECK: ICD-10-CM

## 2021-11-10 DIAGNOSIS — F43.9 STRESS: ICD-10-CM

## 2021-11-10 DIAGNOSIS — G44.209 MIXED MIGRAINE AND MUSCLE CONTRACTION HEADACHE: ICD-10-CM

## 2021-11-10 DIAGNOSIS — G44.209 MUSCLE TENSION HEADACHE: ICD-10-CM

## 2021-11-10 DIAGNOSIS — Z23 ENCOUNTER FOR IMMUNIZATION: ICD-10-CM

## 2021-11-10 PROCEDURE — 90471 IMMUNIZATION ADMIN: CPT | Performed by: FAMILY MEDICINE

## 2021-11-10 PROCEDURE — 99214 OFFICE O/P EST MOD 30 MIN: CPT | Performed by: FAMILY MEDICINE

## 2021-11-10 PROCEDURE — 90686 IIV4 VACC NO PRSV 0.5 ML IM: CPT | Performed by: FAMILY MEDICINE

## 2021-11-10 RX ORDER — ONDANSETRON 4 MG/1
4 TABLET, ORALLY DISINTEGRATING ORAL
Qty: 20 TABLET | Refills: 1 | Status: SHIPPED | OUTPATIENT
Start: 2021-11-10

## 2021-11-10 RX ORDER — METHOCARBAMOL 500 MG/1
500 TABLET, FILM COATED ORAL
Qty: 20 TABLET | Refills: 0 | Status: SHIPPED | OUTPATIENT
Start: 2021-11-10

## 2021-11-10 RX ORDER — BUTALBITAL, ACETAMINOPHEN AND CAFFEINE 300; 40; 50 MG/1; MG/1; MG/1
1 CAPSULE ORAL
COMMUNITY

## 2021-11-10 RX ORDER — BUTALBITAL, ACETAMINOPHEN AND CAFFEINE 50; 325; 40 MG/1; MG/1; MG/1
1 TABLET ORAL
COMMUNITY
End: 2021-11-10

## 2021-11-10 NOTE — PROGRESS NOTES
Chief Complaint   Patient presents with   [address] Follow Up     ER f/u for migraine     1. \"Have you been to the ER, urgent care clinic since your last visit? Hospitalized since your last visit? \" Yes Where: [facility]    2. \"Have you seen or consulted any other health care providers outside of the [address] [place] [address] since your last visit? \" No     3. For patients over 45: Has the patient had a colonoscopy? No     If the patient is female:    4. For patients over 40: Has the patient had a mammogram? No    5. For patients over 21: Has the patient had a pap smear?  Gyn Dr Phelps Hem

## 2021-11-10 NOTE — PROGRESS NOTES
Chief Complaint   Patient presents with    ED Follow-up     [facility] 10-27-21    Migraine    Stress       HISTORY OF PRESENT ILLNESS   HPI  Patient with h/o mixed headache syndrome related to migraines, cluster headaches, and muscle contraction headaches presents for follow up from [facility] 10-27-21 for headaches. She states that she has not had a cluster headache since 2019 and that she only gets her typical migraine headaches about once every few months. They typically only last for one day and abated by OTC Nsaids. She had not had a migraine for a few months but back on ~ 10-24 she got a migraine that persisted for 3-4 days which is what prompted her to go to the ER. Her migraines are typically unilateral frontal/parietal region, preceded by \"aura\" of neck tightness and tingling in her left arm along w/ vertigo, then followed by the headache w/ photophobia/phonophobia, nausea and all heightened senses. She sometimes gets vomiting but didn't w/ this recent migraine. She did not have any of her Zofran left. She had been taking Motrin 800 mg but the head pain did not go away. She contacted her neurologist Dr. Abdoulaye Becerra on 10/27 but he was booked far out so she was advised to go to the ER. She drove herself there. In the ER her vitals were all good and stable, /83, rate 88 and she was afebrile. Her Head CT was negative and her neuro exam was noted to be non-focal.   She was treated w/ Decadron IV, Magnesium IV, Benadryl 50 mg, Compazine, and Toradol. She states that helped tremendously and once stabilized was dc'd home w/ rx for Fioricet. Since discharge she has not had any more migraines. She had 2 mild generalized headaches the days following dc and took the Fioricet once each time which really helped. She has not needed it anymore since. She has not seen her neurologist since 2019 when she had a bad cluster migraine.   She was treated w/ steroids and prescribed a nasal spray to use prn but she never needed it bc the steroids knocked it out. She reports h/o TMJ and wears a mouthguard but the past week her TMJ has been bad. Her jaws feel tight, tense, sore. The mouthguard right now is making it feel worse because she is clenching so much and so tense. She has been under a lot of stress lately and can feel generalized muscle tension in her neck, shoulders, upper back. She is going for a massage later today. She also started seeing a counselor a few weeks ago. REVIEW OF SYMPTOMS   Review of Systems   Constitutional: Negative. Negative for chills and fever. HENT: Negative. Eyes: Negative. Respiratory: Negative. Cardiovascular: Negative. Neurological: Negative for speech change and focal weakness. Endo/Heme/Allergies: Negative. Psychiatric/Behavioral: The patient has insomnia.             PROBLEM LIST/MEDICAL HISTORY     Problem List  Date Reviewed: 11/10/2021          Codes Class Noted    Multiple food allergies ICD-10-CM: Z91.018  ICD-9-CM: V15.05  3/7/2019    Overview Signed 3/7/2019  2:58 PM by MD Clarissa Francisco Dr; coconut, seafood, corn, several fruits             Multiple environmental allergies ICD-10-CM: Z91.09  ICD-9-CM: V15.09  3/7/2019    Overview Signed 3/7/2019  2:58 PM by MD Clarissa Francisco Dr, Immunotherapy since ~ 2017             TMJ syndrome ICD-10-CM: M26.629  ICD-9-CM: 524.69  2/1/2019    Overview Addendum 3/7/2019  3:04 PM by Jia Gannon MD     [phone] [facility] Vandana Jiang; Dentist custom mouth guard [phone]             Irritable bowel syndrome with constipation ICD-10-CM: K58.9  ICD-9-CM: 564.1  4/6/2016    Overview Addendum 4/6/2016  8:33 PM by Jia Gannon MD     GI Dr Rausch Gist: EGD and Colonoscopy 2-2016             Cervicalgia ICD-10-CM: M54.2  ICD-9-CM: 723.1  4/6/2016    Overview Addendum 4/6/2016  8:56 PM by Jia Gannon MD     Ortho Dr Emily Yancey 2015, Pain Mgt Dr Iain Hayward , PT; Rheum Dr Aj Chilel [phone]             Cervical radiculitis, LUE Paresthesias and Pain ICD-10-CM: M54.12  ICD-9-CM: 723.4  4/6/2016    Overview Signed 4/6/2016  8:39 PM by Vinay Cortez MD     Ortho Dr Eze Olmos 2015, Pain Mgt Dr Iain Hayward , PT             Chronic nausea ICD-10-CM: R11.0  ICD-9-CM: 787.02  4/6/2016    Overview Signed 4/6/2016  8:56 PM by Vinay Cortez MD     GI Dr Efrain Stewart: EGD and Colonoscopy 2-2016             Recurrent Syncope ICD-10-CM: R55  ICD-9-CM: 780.2  7/7/2015    Overview Signed 7/7/2015  1:02 PM by Vinay Cortez MD     ENT, Cardiologist, Neurologist, Sleep consult: [phone] eval all negative; did vestibular therapy at [address] [place] [address] at [address]             Chronic fatigue ICD-10-CM: R53.82  ICD-9-CM: 780.79  7/7/2015    Overview Signed 7/7/2015  1:03 PM by Vinay Cortez MD     Since summer 2013, sleep eval 2014 negative             Recurrent Syncope and Presyncope ICD-10-CM: R55  ICD-9-CM: 780.2  7/7/2015        Periodic limb movement disorder ICD-10-CM: G47.61  ICD-9-CM: 327.51  7/7/2015    Overview Signed 7/7/2015  1:44 PM by Vinay Cortez MD     Sleep eval Dr Zach Thornton, 7/2014             Vertigo ICD-10-CM: R42  ICD-9-CM: 780.4  11/25/2014    Overview Addendum 7/7/2015  1:39 PM by Vinay Cortez MD     ENT Dr Dayana Chavira , Cardiologist Dr Gricelda Garcia, Neurologist Dr Eveline Winkler, Sleep consult: [phone] eval all negative; did vestibular therapy at [address] [place] [address] at . [address]: Z28.283  ICD-9-CM: 346.90  11/25/2014    Overview Addendum 3/7/2019  3:07 PM by Vinay Cortez MD     Chronic and Vestibular; Neuro Dr Eveline Winkler; MRI Brain ; New referral 3-2019 Dr Veronique Lowe             Mechanical back pain ICD-10-CM: M54.9  ICD-9-CM: 724.5  5/21/2014    Overview Addendum 4/6/2016  1:12 PM by Vinay Cortez MD     Upper and lower back;  Since age 13 yo chronic LBP; Eval since [id number]: Dr Brittney Yancey: plain films, MRI's, EMG studies, Neuro consult, PT on and off, dry needling 3/2014, Chiro 9/2015 x months             Uterine prolapse ICD-10-CM: N81.4  ICD-9-CM: 618.1  6/28/2010        AR (allergic rhinitis) ICD-10-CM: J30.9  ICD-9-CM: 477.9  6/28/2010    Overview Signed 4/6/2016  8:40 PM by MD Lonnie Trevino Allergy eval [id number]                       PAST SURGICAL HISTORY     Past Surgical History:   Procedure Laterality Date    HX BLADDER SUSPENSION  3/23/15    prolapse    HX GYN  2003    Cryotherapy for abnormal pap    HX PARTIAL HYSTERECTOMY  3/23/15    prolapse; cervix and ovaries intact         MEDICATIONS     Current Outpatient Medications   Medication Sig    butalbital-acetaminophen-caff (FIORICET) -40 mg per capsule Take 1 Capsule by mouth every six (6) hours as needed for Headache or Migraine.  fexofenadine (ALLEGRA) 180 mg tablet Take 180 mg by mouth daily. No current facility-administered medications for this visit.           ALLERGIES     Allergies   Allergen Reactions    Bactrim [Sulfamethoprim Ds] Swelling     Swollen tongue    Imitrex [Sumatriptan] Other (comments)     Heart palpitations, felt like throat was closing, worsening head pain, light headed and spacy    Codeine Rash          SOCIAL HISTORY     Social History     Tobacco Use    Smoking status: Never Smoker    Smokeless tobacco: Never Used   Substance Use Topics    Alcohol use: No     Social History     Social History Narrative        She lives with her  and 2 kids in [place]     4 yo girl 12 yo boy     at [employer] since 2017, 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[id number], company/group counseling/support    Avoids caffeine     Social History     Substance and Sexual Activity   Sexual Activity Yes    Partners: Male    Birth control/protection: Surgical    Comment: Hysterectomy 2015, -Vasectomy       IMMUNIZATIONS     Immunization History   Administered Date(s) Administered    COVID-19, Дмитрий Byrnes, Primary or Immunocompromised Series, MRNA, PF, 100mcg/0.5mL 03/24/2021, 04/23/2021, 11/06/2021    Influenza Vaccine 01/20/2020    Influenza Vaccine (Quad) PF (>6 Mo Flulaval, Fluarix, and >3 Yrs Afluria, Fluzone [id number]) 11/10/2021    Influenza Vaccine Split 11/17/2011    Tdap 12/31/2009         FAMILY HISTORY     Family History   Problem Relation Age of Onset    Asthma Mother     Asthma Brother     Asthma Son     Allergic Rhinitis Son     Cancer Maternal Grandmother     Diabetes Maternal Grandmother     Hypertension Maternal Grandmother     Elevated Lipids Maternal Grandmother     Allergic Rhinitis Daughter         food and environmental allergies         VITALS     Visit Vitals  /72 (BP 1 Location: Left upper arm, BP Patient Position: Sitting, BP Cuff Size: Adult)   Pulse 77   Temp 99 °F (37.2 °C) (Oral)   Resp 16   Ht 5' 3\" (1.6 m)   Wt 146 lb 12.8 oz (66.6 kg)   LMP 02/03/2015   SpO2 99%   BMI 26.00 kg/m²          PHYSICAL EXAMINATION   Physical Exam  Vitals reviewed. Constitutional:       General: She is not in acute distress. Appearance: Normal appearance. She is not ill-appearing. HENT:      Right Ear: Tympanic membrane normal.      Left Ear: Tympanic membrane normal.   Eyes:      Extraocular Movements: Extraocular movements intact. Conjunctiva/sclera: Conjunctivae normal.      Pupils: Pupils are equal, round, and reactive to light. Neck:      Vascular: No carotid bruit. Cardiovascular:      Rate and Rhythm: Normal rate and regular rhythm. Heart sounds: Normal heart sounds. Pulmonary:      Effort: Pulmonary effort is normal.      Breath sounds: Normal breath sounds. Abdominal:      Palpations: Abdomen is soft. Tenderness: There is no abdominal tenderness. Musculoskeletal:         General: Normal range of motion. Cervical back: Neck supple. Normal range of motion. Lymphadenopathy:      Cervical: No cervical adenopathy. Neurological:      General: No focal deficit present. Mental Status: She is alert and oriented to person, place, and time. Cranial Nerves: No cranial nerve deficit. Psychiatric:         Mood and Affect: Mood normal.                 ASSESSMENT & PLAN       ICD-10-CM ICD-9-CM    1. ER discharge follow-up from [facility] ER 10-27-21 for migraine headache Z09 V67.59    2. Mixed headache syndrome: migraines and muscle contraction headache  G43.909 346.90 ondansetron (ZOFRAN ODT) 4 mg disintegrating tablet    G44.209 307.81 methocarbamoL (ROBAXIN) 500 mg tablet   3. Muscle tension headache  G44.209 307.81 methocarbamoL (ROBAXIN) 500 mg tablet   4. Muscle spasms of neck  M62.838 728.85 methocarbamoL (ROBAXIN) 500 mg tablet   5. TMJ syndrome  M26.629 524.69 methocarbamoL (ROBAXIN) 500 mg tablet   6. Stress  F43.9 V62.89 She started seeing a counselor 3 weeks ago   7. Encounter for immunization  Z23 V03.89 AL IMMUNIZ ADMIN,1 SINGLE/COMB VAC/TOXOID      INFLUENZA VIRUS VAC QUAD,SPLIT,PRESV FREE SYRINGE IM   8. Needs flu shot  Z23 V04.81 INFLUENZA VIRUS VAC QUAD,SPLIT,PRESV FREE SYRINGE IM     Reports from [facility] ER 10/27 reviewed today. Head CT was negative. She was treated w/ Decadron IV, Magnesium IV, Benadryl 50 mg, Compazine, and Toradol. She states that helped tremendously and once stabilized was dc'd home w/ rx for Fioricet. Since discharge she has not had any more migraines. She had 2 mild generalized headaches the days following dc and took the Fioricet once each time which really helped. She has not needed it anymore since but still has plenty on hand to use if needed  Renewed her rx for Zofran to have on hand if needed.   Robaxin 500 mg 1 tab qhs prn for now  She is going for massage therapy today and will also be doing some stretching/yoga exercises  She started seeing a counselor a few weeks ago and will continue w/ that  Recommended she start Magnesium nightly as well  Reviewed medications, effects, risks/benefits, precautions, potential interactions and possbile side effects  She is scheduled for follow up w/ her neurologist Dr. Gustavo Trivedi [phone] because that was the soonest available  She will call back or follow up here sooner in the interim if needed

## 2022-03-19 PROBLEM — M26.629 TMJ SYNDROME: Status: ACTIVE | Noted: 2019-02-01

## 2022-03-19 PROBLEM — Z91.09 MULTIPLE ENVIRONMENTAL ALLERGIES: Status: ACTIVE | Noted: 2019-03-07

## 2022-03-19 PROBLEM — Z91.018 MULTIPLE FOOD ALLERGIES: Status: ACTIVE | Noted: 2019-03-07

## 2022-08-22 ENCOUNTER — NURSE TRIAGE (OUTPATIENT)
Dept: OTHER | Facility: CLINIC | Age: 42
End: 2022-08-22

## 2022-08-22 ENCOUNTER — TELEPHONE (OUTPATIENT)
Dept: FAMILY MEDICINE CLINIC | Age: 42
End: 2022-08-22

## 2022-08-22 NOTE — TELEPHONE ENCOUNTER
Called pt she said that she called to make an appt for some numbness and tingling in her hands and feet for the last month. She states that she was on the phone for 30 40 min to make the appt. She kept getting transferred to spoke to a nurse that asked her a lot of questions. I apologized for the inconvenience. I scheduled her for 9/7.

## 2022-08-22 NOTE — TELEPHONE ENCOUNTER
Received call from Sandie Galvin at [facility] with Red Flag Complaint. Subjective: Caller states \"I'm having numbness and tingling in my hands and feet. I drive a far distance to work ([place]) and wonder if something is wrong d/t all the holding of the steering wheel. Fingers will become numb and tingling is often. I'm even having trouble distinguishing between numbness and tingling. There are times that I only feel it in one extremity and the others are fine. Sometimes it is just fingertips. When it is the foot, it can be the bottom of the foot. Sometimes it's all four extremities at the same time. I've had trouble for years with my L side. There are some times that there is weakness on the L side. When I'm really tired, I can't lift my L arm. \"     Onset: 4 weeks ago; gradual.     Pain Severity:  sometimes the numbness and tingling can be a little painful    Temperature: no fever     What has been tried: nothing so far      Recommended disposition: See PCP within 3 Days    Care advice provided, patient verbalizes understanding; denies any other questions or concerns; instructed to call back for any new or worsening symptoms. Sent message via [facility] to call pt to make an appt. Attention Provider: Thank you for allowing me to participate in the care of your patient. The patient was connected to triage in response to information provided to the [facility]. Please do not respond through this encounter as the response is not directed to a shared pool.     Reason for Disposition   [1] Numbness or tingling in one or both feet AND [2] is a chronic symptom (recurrent or ongoing AND present > 4 weeks)    Protocols used: Neurologic Deficit-ADULT-[place]
Unable to assess

## 2022-09-07 ENCOUNTER — OFFICE VISIT (OUTPATIENT)
Dept: FAMILY MEDICINE CLINIC | Age: 42
End: 2022-09-07
Payer: COMMERCIAL

## 2022-09-07 VITALS
HEART RATE: 72 BPM | RESPIRATION RATE: 16 BRPM | HEIGHT: 63 IN | TEMPERATURE: 98.1 F | OXYGEN SATURATION: 100 % | DIASTOLIC BLOOD PRESSURE: 64 MMHG | BODY MASS INDEX: 25.83 KG/M2 | WEIGHT: 145.8 LBS | SYSTOLIC BLOOD PRESSURE: 108 MMHG

## 2022-09-07 DIAGNOSIS — I73.00 RAYNAUD'S PHENOMENON WITHOUT GANGRENE: ICD-10-CM

## 2022-09-07 DIAGNOSIS — R20.2 PARESTHESIA OF BOTH HANDS: Primary | ICD-10-CM

## 2022-09-07 DIAGNOSIS — R20.2 PARESTHESIA OF BOTH FEET: ICD-10-CM

## 2022-09-07 DIAGNOSIS — G43.109 COMPLICATED MIGRAINE: ICD-10-CM

## 2022-09-07 LAB
ALBUMIN SERPL-MCNC: 3.9 G/DL (ref 3.5–5)
ALBUMIN/GLOB SERPL: 1.2 {RATIO} (ref 1.1–2.2)
ALP SERPL-CCNC: 57 U/L (ref 45–117)
ALT SERPL-CCNC: 14 U/L (ref 12–78)
ANION GAP SERPL CALC-SCNC: 4 MMOL/L (ref 5–15)
AST SERPL-CCNC: 10 U/L (ref 15–37)
BILIRUB SERPL-MCNC: 0.4 MG/DL (ref 0.2–1)
BUN SERPL-MCNC: 14 MG/DL (ref 6–20)
BUN/CREAT SERPL: 14 (ref 12–20)
CALCIUM SERPL-MCNC: 9 MG/DL (ref 8.5–10.1)
CHLORIDE SERPL-SCNC: 111 MMOL/L (ref 97–108)
CO2 SERPL-SCNC: 23 MMOL/L (ref 21–32)
CREAT SERPL-MCNC: 1.03 MG/DL (ref 0.55–1.02)
ERYTHROCYTE [DISTWIDTH] IN BLOOD BY AUTOMATED COUNT: 14.3 % (ref 11.5–14.5)
EST. AVERAGE GLUCOSE BLD GHB EST-MCNC: 111 MG/DL
FOLATE SERPL-MCNC: 29.8 NG/ML (ref 5–21)
GLOBULIN SER CALC-MCNC: 3.3 G/DL (ref 2–4)
GLUCOSE SERPL-MCNC: 86 MG/DL (ref 65–100)
HBA1C MFR BLD: 5.5 % (ref 4–5.6)
HCT VFR BLD AUTO: 42.2 % (ref 35–47)
HGB BLD-MCNC: 13.7 G/DL (ref 11.5–16)
MAGNESIUM SERPL-MCNC: 2.2 MG/DL (ref 1.6–2.4)
MCH RBC QN AUTO: 27.6 PG (ref 26–34)
MCHC RBC AUTO-ENTMCNC: 32.5 G/DL (ref 30–36.5)
MCV RBC AUTO: 84.9 FL (ref 80–99)
NRBC # BLD: 0 K/UL (ref 0–0.01)
NRBC BLD-RTO: 0 PER 100 WBC
PLATELET # BLD AUTO: 267 K/UL (ref 150–400)
PMV BLD AUTO: 11.3 FL (ref 8.9–12.9)
POTASSIUM SERPL-SCNC: 4.2 MMOL/L (ref 3.5–5.1)
PROT SERPL-MCNC: 7.2 G/DL (ref 6.4–8.2)
RBC # BLD AUTO: 4.97 M/UL (ref 3.8–5.2)
SODIUM SERPL-SCNC: 138 MMOL/L (ref 136–145)
TSH SERPL DL<=0.05 MIU/L-ACNC: 1.71 UIU/ML (ref 0.36–3.74)
VIT B12 SERPL-MCNC: 581 PG/ML (ref 193–986)
WBC # BLD AUTO: 3.2 K/UL (ref 3.6–11)

## 2022-09-07 PROCEDURE — 99214 OFFICE O/P EST MOD 30 MIN: CPT | Performed by: FAMILY MEDICINE

## 2022-09-07 RX ORDER — VIBEGRON 75 MG/1
1 TABLET, FILM COATED ORAL DAILY
COMMUNITY

## 2022-09-07 RX ORDER — TOPIRAMATE 50 MG/1
50 TABLET, FILM COATED ORAL 2 TIMES DAILY
COMMUNITY
Start: 2022-07-28

## 2022-09-07 NOTE — PROGRESS NOTES
Chief Complaint   Patient presents with    Numbness     All toes and fingers both sides x 5-6 weeks    Tingling     All toes and fingers both sides x 5-6 weeks     1. \"Have you been to the ER, urgent care clinic since your last visit? Hospitalized since your last visit? \" No    2. \"Have you seen or consulted any other health care providers outside of the [address] [place] [address] since your last visit? \" Yes gyn, neurologist Dr Kriss Currie a few weeks ago    3. For patients aged 39-70: Has the patient had a colonoscopy / FIT/ Cologuard? NA - based on age      If the patient is female:    4. For patients aged 41-77: Has the patient had a mammogram within the past 2 years? Scheduled for couple of weeks      5. For patients aged 21-65: Has the patient had a pap smear?  Gyn Dr Kera Garcia yesterday for annual

## 2022-09-08 NOTE — PROGRESS NOTES
Chief Complaint   Patient presents with    Numbness     All toes and fingers both sides x 5-6 weeks    Tingling     All toes and fingers both sides x 5-6 weeks       HISTORY OF PRESENT ILLNESS   Patient presents w/ 5-6 week h/o numbness and tingling involving bilateral fingers and toes of all digits. Symptoms are occurring most days and she cannot identify any particular pattern. The palms of her hands, wrists and other areas of her feet are unaffected. There is no associated extremity pain or weakness. No joint swelling, redness or effusions. Not interfering w/ her functionality, job or day to day activities. Back in March of this year when she was walking around the track in cooler weather she experienced an episode of her fingers turning blue temporarily. This happened again about 4 times between then and July, but each time while she was walking outside around the track. No occurrences of that since then. Also about 10-15 yrs ago she saw several specialists for neuropathy and weakness involving her entire LUE. She had xrays, MRI's, scans, and nerve conduction studies but states that nothing was found. She did PT for this and her symptoms improved. That has not occurred recently and her current symptoms are very different. She also has h/o chronic migraine headaches and vertigo. She has been followed by neurologists over the years and also had brain scans. She has been seeing Dr. Todd Foster whom she states came highly recommended as a migraine specialist for her h/o complex migraines. At her last visit w/ him about 4 weeks ago she did mention the paresthesias to him. He has recommended that she start taking B6 and Magnesium, neither of which she has started yet. He has also referred her for PT. She goes back to follow up w/ him again in November.    She works as a  at [employer] if the long drives contribute to her symptoms but she has done this type of work/commute for years  Avoids caffeine  No tobacco, etoh or illict drug use  No special diets except has multiple food allergies  No regular exercise, but occasionally goes for a walk     REVIEW OF SYMPTOMS   Review of Systems   Constitutional: Negative. HENT: Negative. Eyes: Negative. Respiratory: Negative. Cardiovascular: Negative. Gastrointestinal: Negative. Musculoskeletal:  Negative for back pain, joint pain and neck pain. Neurological:  Positive for tingling. Negative for speech change, focal weakness and seizures. Endo/Heme/Allergies: Negative.           PROBLEM LIST/MEDICAL HISTORY     Problem List  Date Reviewed: 9/7/2022            Codes Class Noted    Multiple food allergies ICD-10-CM: Z91.018  ICD-9-CM: V15.05  3/7/2019    Overview Signed 3/7/2019  2:58 PM by MD Denisse Ingram Dr; coconut, seafood, corn, several fruits             Multiple environmental allergies ICD-10-CM: Z91.09  ICD-9-CM: V15.09  3/7/2019    Overview Signed 3/7/2019  2:58 PM by MD Denisse Ingram Dr, Immunotherapy since ~ 2017             TMJ syndrome ICD-10-CM: M26.629  ICD-9-CM: 524.69  2/1/2019    Overview Addendum 3/7/2019  3:04 PM by Dallas Rdz MD     [phone] Dr Casper Shows; Dentist custom mouth guard [phone]             Irritable bowel syndrome with constipation ICD-10-CM: K58.9  ICD-9-CM: 564.1  4/6/2016    Overview Addendum 4/6/2016  8:33 PM by Dallas Rdz MD     GI Dr Yesica Thakkar: EGD and Colonoscopy 2-2016             Cervicalgia ICD-10-CM: M54.2  ICD-9-CM: 723.1  4/6/2016    Overview Addendum 4/6/2016  8:56 PM by Dallas Rdz MD     Ortho Dr Carleen Ocasio 2015, Pain Mgt Dr Devlin Boards , PT; Rheum Dr Neha Corona [phone]             Cervical radiculitis, LUE Paresthesias and Pain ICD-10-CM: M54.12  ICD-9-CM: 723.4  4/6/2016    Overview Signed 4/6/2016  8:39 PM by Dallas Rdz MD Ortho Dr Sharad Henderson 2015, Pain Mgt Dr Clarence Alexis , PT             Chronic nausea ICD-10-CM: R11.0  ICD-9-CM: 787.02  4/6/2016    Overview Signed 4/6/2016  8:56 PM by Joseph Cline MD     GI Dr Juhi Donnelly: EGD and Colonoscopy 2-2016             Recurrent Syncope ICD-10-CM: R55  ICD-9-CM: 780.2  7/7/2015    Overview Signed 7/7/2015  1:02 PM by Joseph Cline MD     ENT, Cardiologist, Neurologist, Sleep consult: [phone] eval all negative; did vestibular therapy at [address] [place] [address] at [address]             Chronic fatigue ICD-10-CM: R53.82  ICD-9-CM: 780.79  7/7/2015    Overview Signed 7/7/2015  1:03 PM by Joseph Cline MD     Since summer 2013, sleep eval 2014 negative             Recurrent Syncope and Presyncope ICD-10-CM: R55  ICD-9-CM: 780.2  7/7/2015        Periodic limb movement disorder ICD-10-CM: G47.61  ICD-9-CM: 327.51  7/7/2015    Overview Signed 7/7/2015  1:44 PM by Joseph Cline MD     Sleep eval Dr Josiah Coronado, 7/2014             Vertigo ICD-10-CM: R42  ICD-9-CM: 780.4  11/25/2014    Overview Addendum 7/7/2015  1:39 PM by Joseph Cline MD     ENT Dr Lita William , Cardiologist Dr Sb Kennedy, Neurologist Dr Roland Bardales, Sleep consult: [phone] eval all negative; did vestibular therapy at [address] [place] [address] at [address]: F21.798  ICD-9-CM: 346.90  11/25/2014    Overview Addendum 9/7/2022 10:24 AM by Joseph Cline MD     Chronic and Vestibular; Neuro Dr Roland Bardales; MRI Brain ; New referral 3-2019 Dr. Lissett Fuentes to present             Mechanical back pain ICD-10-CM: M54.9  ICD-9-CM: 724.5  5/21/2014    Overview Addendum 4/6/2016  1:12 PM by Joseph Cline MD     Upper and lower back;  Since age 11 yo chronic LBP; Eval since [id number]: Dr Leoncio Gudino: plain films, MRI's, EMG studies, Neuro consult, PT on and off, dry needling 3/2014, Chiro 9/2015 x months             Uterine prolapse ICD-10-CM: N81.4  ICD-9-CM: 618.1  6/28/2010 AR (allergic rhinitis) ICD-10-CM: J30.9  ICD-9-CM: 477.9  6/28/2010    Overview Signed 4/6/2016  8:40 PM by Herrera Jurado MD     [facility] eval [phone]                    PAST SURGICAL HISTORY     Past Surgical History:   Procedure Laterality Date    HX BLADDER SUSPENSION  3/23/15    prolapse    HX GYN  2003    Cryotherapy for abnormal pap    HX PARTIAL HYSTERECTOMY  3/23/15    prolapse; cervix and ovaries intact         MEDICATIONS     Current Outpatient Medications   Medication Sig    topiramate (TOPAMAX) 50 mg tablet Take 50 mg by mouth two (2) times a day. vibegron (Gemtesa) 75 mg tab Take 1 Tablet by mouth daily. Per Gyn since 9/6/22  Indications: overactive bladder    ondansetron (ZOFRAN ODT) 4 mg disintegrating tablet Take 1 Tablet by mouth every eight (8) hours as needed for Nausea or Vomiting (associated w/ migraines). fexofenadine (ALLEGRA) 180 mg tablet Take 180 mg by mouth daily. butalbital-acetaminophen-caff (FIORICET) -40 mg per capsule Take 1 Capsule by mouth every six (6) hours as needed for Headache or Migraine. (Patient not taking: Reported on 9/7/2022)    methocarbamoL (ROBAXIN) 500 mg tablet Take 1 Tablet by mouth nightly as needed for Muscle Spasm(s) (and Tension/Tightness related to stress, TMJ and headaches). (Patient not taking: Reported on 9/7/2022)     No current facility-administered medications for this visit.           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[id number], company/group counseling/support    Avoids caffeine    No tobacco or etoh    No special diets except has multiple food allergies    No regular exercise, occasionally walks     Social History     Substance and Sexual Activity   Sexual Activity Yes    Partners: Male    Birth control/protection: Surgical    Comment: Hysterectomy 2015, -Vasectomy       IMMUNIZATIONS     Immunization History   Administered Date(s) Administered    COVID-19, MODERNA BLUE border, Primary or Immunocompromised, (age 18y+), IM, 100 mcg/0.5mL 03/24/2021, 04/23/2021, 11/06/2021    Influenza Vaccine 01/20/2020    Influenza Vaccine Split 11/17/2011    Influenza, FLUARIX, FLULAVAL, FLUZONE (age 10 mo+) AND AFLURIA, (age 1 y+), PF, 0.5mL 11/10/2021    Tdap 12/31/2009         FAMILY HISTORY     Family History   Problem Relation Age of Onset    Asthma Mother     Asthma Brother     Cancer Maternal Grandmother     Diabetes Maternal Grandmother     Hypertension Maternal Grandmother     Elevated Lipids Maternal Grandmother     Allergic Rhinitis Daughter         food and environmental allergies    Asthma Son     Allergic Rhinitis Son     Neuropathy Neg Hx          VITALS   Visit Vitals  /64 (BP 1 Location: Left upper arm, BP Patient Position: Sitting, BP Cuff Size: Adult long)   Pulse 72   Temp 98.1 °F (36.7 °C) (Oral)   Resp 16   Ht 5' 3\" (1.6 m)   Wt 145 lb 12.8 oz (66.1 kg)   LMP 02/03/2015   SpO2 100%   BMI 25.83 kg/m²          PHYSICAL EXAMINATION   Physical Exam  Vitals reviewed. Constitutional:       General: She is not in acute distress. Appearance: Normal appearance. Eyes:      Extraocular Movements: Extraocular movements intact. Neck:      Thyroid: No thyroid mass, thyromegaly or thyroid tenderness. Cardiovascular:      Rate and Rhythm: Normal rate and regular rhythm.       Pulses: Normal pulses. Heart sounds: Normal heart sounds. Pulmonary:      Effort: Pulmonary effort is normal.      Breath sounds: Normal breath sounds. Musculoskeletal:         General: No swelling or tenderness. Normal range of motion. Cervical back: Full passive range of motion without pain and neck supple. Right lower leg: No edema. Left lower leg: No edema. Skin:     General: Skin is warm and dry. Neurological:      General: No focal deficit present. Mental Status: She is alert and oriented to person, place, and time. Mental status is at baseline. Cranial Nerves: No cranial nerve deficit or dysarthria. Sensory: No sensory deficit. Motor: Motor function is intact. No weakness, atrophy or abnormal muscle tone. Coordination: Coordination normal.      Gait: Gait normal.      Deep Tendon Reflexes: Reflexes normal.      Comments:  intact and equal.  Normal neurovascular exam.  Negative Phalen's. ASSESSMENT & PLAN   Diagnoses and all orders for this visit:    1. Paresthesia of both hands  -     CBC W/O DIFF; Future  -     METABOLIC PANEL, COMPREHENSIVE; Future  -     TSH 3RD GENERATION; Future  -     HEMOGLOBIN A1C WITH EAG; Future  -     VITAMIN B12; Future  -     FOLATE; Future  -     MAGNESIUM; Future  -     EMG TWO EXTREMITIES UPPER; Future    2. Paresthesia of both feet  -     CBC W/O DIFF; Future  -     METABOLIC PANEL, COMPREHENSIVE; Future  -     TSH 3RD GENERATION; Future  -     HEMOGLOBIN A1C WITH EAG; Future  -     VITAMIN B12; Future  -     FOLATE; Future  -     MAGNESIUM; Future  -     EMG TWO EXTREMITIES LOWER; Future    3. Raynaud's phenomenon without gangrene  -     CBC W/O DIFF; Future  -     METABOLIC PANEL, COMPREHENSIVE; Future  -     TSH 3RD GENERATION; Future  -     POLA, DIRECT, W/REFLEX; Future    4.  History Chronic Complicated Migraine Headaches      Check labs as ordered above and schedule EMG of BUE/BLE  She is followed routinely by her neurologist Dr. Savannah Maria for h/o complex migraines and vertigo  At her last visit w/ him about 4 weeks ago she did mention the paresthesias to him as well. He has recommended that she start taking B6 and Magnesium, neither of which she has started yet. Encouraged her to go ahead and proceed w/ his recommmendations. He has also referred her for PT. She goes back to follow up w/ him again in November. He also restarted her on Topamax for her migraines which she has taken in the past.  Although the paresthesias can be an effect of the Topamax, her paresthesias started before going back on the Topamax that she had been off of for about a year until restarting a few weeks ago. Further follow up & other recommendations pending review of labs and EMG. These results will all be shared w/ Dr. Savannah Maria as well.

## 2022-09-09 ENCOUNTER — TELEPHONE (OUTPATIENT)
Dept: FAMILY MEDICINE CLINIC | Age: 42
End: 2022-09-09

## 2022-09-09 DIAGNOSIS — D72.819 LEUKOPENIA, UNSPECIFIED TYPE: Primary | ICD-10-CM

## 2022-09-09 DIAGNOSIS — N28.9 MILD RENAL INSUFFICIENCY: ICD-10-CM

## 2022-09-09 LAB — ANA SER QL: NEGATIVE

## 2022-09-09 NOTE — TELEPHONE ENCOUNTER
Advise pt her kidney function is borderline out of range. Is she taking any Nsaid's? (give examples of those); if she is, advise her to stop taking those including if she takes any Excedrin, Motrin, Advil for her headaches. She can take Tylenol prn. Needs to also increase her water intake. Also WBC is slightly low which it has been slightly once in the past as well. This can be a lab technicality or hereditary or other. Her other labs are ok so far, some others still pended. Otherwise so far nothing to explain her symptoms. Will await results of POLA lab and EMG study which is already ordered. Recommend rechecking WBC and kidney function test in about 4-6 weeks. Do NOT fast for those. Please get her booked for lab check OR give her lab draw station near her home. Follow up w/ her neurologist once the EMG results are back and in the time being, start on the supplements he recommended.

## 2022-09-09 NOTE — TELEPHONE ENCOUNTER
Went over results with pt. I scheduled her for lab only on 10/11. She said that she had been taking advil the week before the week of her appt. She will only take tylenol prn    She states that she had seen hematologist before for the low WBC. It was neg. She doesn't remember when and where.   She will start supplements

## 2022-09-11 NOTE — TELEPHONE ENCOUNTER
Oh ok I didn't know about the low WBC in the past. I updated her medical history about that for future as well. Her POLA result is back and negative.

## 2022-09-12 NOTE — TELEPHONE ENCOUNTER
PI of results. Pt states that she was mistaken about the vit B6.   She states that neurologist wants her to take Vit B2.

## 2022-10-11 ENCOUNTER — APPOINTMENT (OUTPATIENT)
Dept: FAMILY MEDICINE CLINIC | Age: 42
End: 2022-10-11

## 2022-10-11 DIAGNOSIS — N28.9 MILD RENAL INSUFFICIENCY: ICD-10-CM

## 2022-10-11 DIAGNOSIS — D72.819 LEUKOPENIA, UNSPECIFIED TYPE: ICD-10-CM

## 2022-10-12 ENCOUNTER — TELEPHONE (OUTPATIENT)
Dept: FAMILY MEDICINE CLINIC | Age: 42
End: 2022-10-12

## 2022-10-12 DIAGNOSIS — R79.89 ELEVATED SERUM CREATININE: Primary | ICD-10-CM

## 2022-10-12 PROBLEM — D72.819 LEUKOPENIA: Status: ACTIVE | Noted: 2022-10-12

## 2022-10-12 LAB
ANION GAP SERPL CALC-SCNC: 3 MMOL/L (ref 5–15)
APPEARANCE UR: ABNORMAL
BACTERIA URNS QL MICRO: ABNORMAL /HPF
BASOPHILS # BLD: 0 K/UL (ref 0–0.1)
BASOPHILS NFR BLD: 1 % (ref 0–1)
BILIRUB UR QL: NEGATIVE
BUN SERPL-MCNC: 15 MG/DL (ref 6–20)
BUN/CREAT SERPL: 13 (ref 12–20)
CALCIUM SERPL-MCNC: 8.8 MG/DL (ref 8.5–10.1)
CHLORIDE SERPL-SCNC: 113 MMOL/L (ref 97–108)
CO2 SERPL-SCNC: 23 MMOL/L (ref 21–32)
COLOR UR: ABNORMAL
CREAT SERPL-MCNC: 1.13 MG/DL (ref 0.55–1.02)
DIFFERENTIAL METHOD BLD: NORMAL
EOSINOPHIL # BLD: 0 K/UL (ref 0–0.4)
EOSINOPHIL NFR BLD: 1 % (ref 0–7)
EPITH CASTS URNS QL MICRO: ABNORMAL /LPF
ERYTHROCYTE [DISTWIDTH] IN BLOOD BY AUTOMATED COUNT: 14.4 % (ref 11.5–14.5)
GLUCOSE SERPL-MCNC: 75 MG/DL (ref 65–100)
GLUCOSE UR STRIP.AUTO-MCNC: NEGATIVE MG/DL
HCT VFR BLD AUTO: 39.7 % (ref 35–47)
HGB BLD-MCNC: 12.4 G/DL (ref 11.5–16)
HGB UR QL STRIP: NEGATIVE
HYALINE CASTS URNS QL MICRO: ABNORMAL /LPF (ref 0–5)
IMM GRANULOCYTES # BLD AUTO: 0 K/UL (ref 0–0.04)
IMM GRANULOCYTES NFR BLD AUTO: 0 % (ref 0–0.5)
KETONES UR QL STRIP.AUTO: NEGATIVE MG/DL
LEUKOCYTE ESTERASE UR QL STRIP.AUTO: ABNORMAL
LYMPHOCYTES # BLD: 1.3 K/UL (ref 0.8–3.5)
LYMPHOCYTES NFR BLD: 31 % (ref 12–49)
MCH RBC QN AUTO: 26.8 PG (ref 26–34)
MCHC RBC AUTO-ENTMCNC: 31.2 G/DL (ref 30–36.5)
MCV RBC AUTO: 85.9 FL (ref 80–99)
MONOCYTES # BLD: 0.4 K/UL (ref 0–1)
MONOCYTES NFR BLD: 9 % (ref 5–13)
NEUTS SEG # BLD: 2.4 K/UL (ref 1.8–8)
NEUTS SEG NFR BLD: 58 % (ref 32–75)
NITRITE UR QL STRIP.AUTO: NEGATIVE
NRBC # BLD: 0 K/UL (ref 0–0.01)
NRBC BLD-RTO: 0 PER 100 WBC
PERIPHERAL SMEAR,PSM: NORMAL
PH UR STRIP: 5.5 [PH] (ref 5–8)
PLATELET # BLD AUTO: 230 K/UL (ref 150–400)
PMV BLD AUTO: 11.5 FL (ref 8.9–12.9)
POTASSIUM SERPL-SCNC: 4.1 MMOL/L (ref 3.5–5.1)
PROT UR STRIP-MCNC: NEGATIVE MG/DL
RBC # BLD AUTO: 4.62 M/UL (ref 3.8–5.2)
RBC #/AREA URNS HPF: ABNORMAL /HPF (ref 0–5)
SODIUM SERPL-SCNC: 139 MMOL/L (ref 136–145)
SP GR UR REFRACTOMETRY: 1.02 (ref 1–1.03)
UROBILINOGEN UR QL STRIP.AUTO: 0.2 EU/DL (ref 0.2–1)
WBC # BLD AUTO: 4.1 K/UL (ref 3.6–11)
WBC URNS QL MICRO: ABNORMAL /HPF (ref 0–4)

## 2022-10-12 NOTE — TELEPHONE ENCOUNTER
Advise patient her follow up WBC was back to normal.     Her creatinine got even more out of range from last check despite her avoiding Nsaids now. It is only mildly elevated but slightly worsened from last check. There was no protein or blood in her urine which is reassuring. Her urine was cloudy and concentrated so perhaps she is on the dry side and not getting enough water in per day. Advise her to increase her water intake, limit intake of caffeine/carbonation, ensure she is staying well hydrated throughout the day and continue to avoid nsaids. Recheck BMP in about a month and tell her to NOT fast when she gets it done. Eat normal diet that day and drink plenty of water. Order pended. You can instruct her on the walk in sites.

## 2022-10-13 NOTE — TELEPHONE ENCOUNTER
Called pt with result. She states understanding. She will go to the [place] walk in lab clinic in a month. I did recommend that she keep an eye on just how much she is drinking. That way she will know for sure if she is getting at least 64 oz a day. Reassured her that Dr Hermes Canela is monitoring things and not to worry. Carlitos ADAMS Gabriel, scribed for Chris Durand MD on 02/01/18 at 1931 .





Throat Pain/Nasal Ric HPI





- HPI Summary


HPI Summary: 





This patient is a 12 year old F presenting to [facility] accompanied by her mother 

with after being sent home from school with a sore throat today. The patient 

rates the pain 3/10 in severity. Patient reports cough and fever. Patient 

denies n/v and myalgia. 





- History of Current Complaint


Chief Complaint: UCRespiratory


Stated Complaint: RUNNY NOSE, AND FEVER


Time Seen by Provider: 02/01/18 19:26


Hx Obtained From: Patient


Hx Last Menstrual Period: 1/11/18


Onset/Duration: Lasting Days - 1, Still Present


Severity: Mild


Pain Intensity: 3


Pain Scale Used: 0-10 Numeric


Cough: Nonproductive


Associated Signs & Symptoms: Positive: Negative - myalgia. n/v, Fever





- Allergies/Home Medications


Allergies/Adverse Reactions: 


 Allergies











Allergy/AdvReac Type Severity Reaction Status Date / Time


 


No Known Allergies Allergy   Verified 02/01/18 18:39














PMH/Surg Hx/FS Hx/Imm Hx


Other Cardiovascular History: born with heart murmur 


Other History Of: 


   Negative For: HIV, Hepatitis B, Hepatitis C





- Surgical History


Surgical History: None





- Family History


Known Family History: 


   Negative: Diabetes, Renal Disease, Respiratory Disease, Seizure Disorder, 

Blood Disorder





- Social History


Occupation: Student


Lives: With Family


Alcohol Use: None


Substance Use Type: None


Smoking Status (MU): Never Smoked Tobacco


Have You Smoked in the Last Year: No


Household Exposure Type: Cigarettes





- Immunization History


Vaccination Up to Date: Yes





Review of Systems


Constitutional: Fever


ENT: Sore Throat


Respiratory: Cough


All Other Systems Reviewed And Are Negative: Yes





Physical Exam


Triage Information Reviewed: Yes


Vital Signs: 


 Initial Vital Signs











Temp  98.8 F   02/01/18 18:36


 


Pulse  79   02/01/18 18:36


 


Resp  12   02/01/18 18:36


 


BP  107/63   02/01/18 18:36


 


Pulse Ox  100   02/01/18 18:36














- Additional Comments





VITAL SIGNS: Reviewed.


GENERAL:  Patient is a well developed and nourished F is lying comfortable in 

the stretcher.  Patient is not in any acute respiratory distress.


HEAD AND FACE: Normocephalic


EYES: PERRLA, EOMI x 2.


EARS: Hearing grossly intact.


MOUTH: Erythema in pharynx and nasal mucosa 


NECK: Supple, trachea is midline, no adenopathy, no JVD, no carotid bruit.


CHEST: Symmetric, no tenderness at palpation


LUNGS: Clear to auscultation bilaterally. No wheezing or crackles.


CVS: Regular rate and rhythm, S1 and S2 present, no murmurs or gallops 

appreciated.


ABDOMEN: Soft, non-tender. Bowel sounds are normal. No abdominal abnormal 

pulsations.


EXTREMITIES: Full ROM in all major joints, no edema, no cyanosis or clubbing.


NEURO: Alert and oriented x 3. No acute neurological deficits. Speech is normal 

and follows commands.


SKIN: Dry and warm








Throat Pain/Nasal Course/Dx





- Course


Assessment/Plan: This patient is a 12 year old F presenting to [facility] 

accompanied by her mother with after being sent home from school with a sore 

throat today. The patient rates the pain 3/10 in severity. Patient reports 

cough and fever. Patient denies n/v and myalgia.  Patient was negative for 

influenza A,B and strep.  I discussed all the findings and test results with 

the patient. Pt was instructed to return to the urgent care or go to ER 

immediately if any of the symptoms return or worsens. Plan of care was 

discussed with the patient and pt understands and agrees. All questions were 

answered to patient satisfaction. There were no further complaints or concerns.

  .  Patient will be diagnosed with URI and viral pharyngitis, be discharged 

home, and follow up from PCP.  The patient is agreeable with this plan.





- Differential Dx/Diagnosis


Provider Diagnoses: URI and viral pharyngitis





Discharge





- Discharge Plan


Condition: Stable


Disposition: HOME


Patient Education Materials:  Pharyngitis in Children (ED), Upper Respiratory 

Infection (DC)


Forms:  *School Release


Referrals: 


Juhi Galindo DO [Primary Care Provider] - 


Additional Instructions: 


Take Ibuprofen or tylenol for fever or pain


Increase your fluid intake


Return to the  if symptoms worsen











The documentation as recorded by the Carlitos daniels Gabriel accurately reflects 

the service I personally performed and the decisions made by me, Chris Durand MD.

## 2022-11-11 ENCOUNTER — TELEPHONE (OUTPATIENT)
Dept: FAMILY MEDICINE CLINIC | Age: 42
End: 2022-11-11

## 2022-11-11 NOTE — TELEPHONE ENCOUNTER
Advise pt great news, her kidney function has improved very nicely and is back in the normal range! Keep doing what she is doing and we will just resume monitoring at her annual routine checkups. Tell her to schedule a fasting physical for next summer/ fall or return sooner in the interim for any problems or concerns.

## 2023-04-14 PROBLEM — F43.23 ADJUSTMENT REACTION WITH ANXIETY AND DEPRESSION: Status: ACTIVE | Noted: 2023-04-14

## 2023-04-14 PROBLEM — F41.0 ANXIETY ATTACK: Status: ACTIVE | Noted: 2023-04-14

## 2023-04-14 PROBLEM — F41.8 SITUATIONAL ANXIETY: Status: ACTIVE | Noted: 2023-04-14

## 2023-05-05 ENCOUNTER — TELEPHONE (OUTPATIENT)
Dept: FAMILY MEDICINE CLINIC | Age: 43
End: 2023-05-05

## 2023-05-11 ENCOUNTER — HOSPITAL ENCOUNTER (EMERGENCY)
Facility: HOSPITAL | Age: 43
Discharge: HOME | End: 2023-05-11
Attending: EMERGENCY MEDICINE
Payer: COMMERCIAL

## 2023-05-11 ENCOUNTER — APPOINTMENT (OUTPATIENT)
Facility: HOSPITAL | Age: 43
End: 2023-05-11
Payer: COMMERCIAL

## 2023-05-11 VITALS
RESPIRATION RATE: 16 BRPM | HEART RATE: 76 BPM | SYSTOLIC BLOOD PRESSURE: 131 MMHG | WEIGHT: 152.34 LBS | DIASTOLIC BLOOD PRESSURE: 89 MMHG | OXYGEN SATURATION: 98 % | HEIGHT: 62 IN | TEMPERATURE: 98.5 F | BODY MASS INDEX: 28.03 KG/M2

## 2023-05-11 DIAGNOSIS — I80.8 SUPERFICIAL THROMBOPHLEBITIS OF LEFT UPPER EXTREMITY: Primary | ICD-10-CM

## 2023-05-11 DIAGNOSIS — M54.12 CERVICAL RADICULOPATHY: ICD-10-CM

## 2023-05-11 LAB — ECHO BSA: 1.74 M2

## 2023-05-11 PROCEDURE — 99284 EMERGENCY DEPT VISIT MOD MDM: CPT | Performed by: EMERGENCY MEDICINE

## 2023-05-11 PROCEDURE — 6360000002 HC RX W HCPCS: Performed by: EMERGENCY MEDICINE

## 2023-05-11 PROCEDURE — 93971 EXTREMITY STUDY: CPT

## 2023-05-11 PROCEDURE — 96372 THER/PROPH/DIAG INJ SC/IM: CPT | Performed by: EMERGENCY MEDICINE

## 2023-05-11 RX ORDER — METHYLPREDNISOLONE 4 MG/1
4 TABLET ORAL
Status: DISCONTINUED | OUTPATIENT
Start: 2023-05-12 | End: 2023-05-11

## 2023-05-11 RX ORDER — IBUPROFEN 400 MG/1
800 TABLET ORAL EVERY 8 HOURS PRN
Status: DISCONTINUED | OUTPATIENT
Start: 2023-05-11 | End: 2023-05-11

## 2023-05-11 RX ORDER — CYCLOBENZAPRINE HCL 10 MG
10 TABLET ORAL 3 TIMES DAILY PRN
Status: DISCONTINUED | OUTPATIENT
Start: 2023-05-11 | End: 2023-05-11

## 2023-05-11 RX ORDER — METHYLPREDNISOLONE 4 MG/1
24 TABLET ORAL ONCE
Status: DISCONTINUED | OUTPATIENT
Start: 2023-05-11 | End: 2023-05-11

## 2023-05-11 RX ORDER — IBUPROFEN 600 MG/1
600 TABLET ORAL EVERY 8 HOURS PRN
Qty: 20 TABLET | Refills: 0 | Status: SHIPPED | OUTPATIENT
Start: 2023-05-11 | End: 2023-05-21

## 2023-05-11 RX ORDER — METHYLPREDNISOLONE 4 MG/1
4 TABLET ORAL NIGHTLY
Status: DISCONTINUED | OUTPATIENT
Start: 2023-05-13 | End: 2023-05-11

## 2023-05-11 RX ORDER — CYCLOBENZAPRINE HCL 10 MG
10 TABLET ORAL NIGHTLY PRN
Qty: 10 TABLET | Refills: 0 | Status: SHIPPED | OUTPATIENT
Start: 2023-05-11 | End: 2023-05-21

## 2023-05-11 RX ORDER — KETOROLAC TROMETHAMINE 30 MG/ML
30 INJECTION, SOLUTION INTRAMUSCULAR; INTRAVENOUS
Status: COMPLETED | OUTPATIENT
Start: 2023-05-11 | End: 2023-05-11

## 2023-05-11 RX ORDER — METHYLPREDNISOLONE 4 MG/1
8 TABLET ORAL NIGHTLY
Status: DISCONTINUED | OUTPATIENT
Start: 2023-05-12 | End: 2023-05-11

## 2023-05-11 RX ORDER — METHYLPREDNISOLONE 4 MG/1
TABLET ORAL
Qty: 1 KIT | Refills: 0 | Status: SHIPPED | OUTPATIENT
Start: 2023-05-11 | End: 2023-05-17

## 2023-05-11 RX ADMIN — KETOROLAC TROMETHAMINE 30 MG: 30 INJECTION, SOLUTION INTRAMUSCULAR; INTRAVENOUS at 08:11

## 2023-05-11 ASSESSMENT — PAIN DESCRIPTION - DESCRIPTORS
DESCRIPTORS: ACHING
DESCRIPTORS: ACHING;THROBBING;SHARP;SHOOTING

## 2023-05-11 ASSESSMENT — PAIN DESCRIPTION - ONSET: ONSET: PROGRESSIVE

## 2023-05-11 ASSESSMENT — PAIN DESCRIPTION - PAIN TYPE: TYPE: ACUTE PAIN

## 2023-05-11 ASSESSMENT — PAIN DESCRIPTION - ORIENTATION
ORIENTATION: LEFT
ORIENTATION: LEFT

## 2023-05-11 ASSESSMENT — LIFESTYLE VARIABLES
HOW OFTEN DO YOU HAVE A DRINK CONTAINING ALCOHOL: MONTHLY OR LESS
HOW MANY STANDARD DRINKS CONTAINING ALCOHOL DO YOU HAVE ON A TYPICAL DAY: 1 OR 2

## 2023-05-11 ASSESSMENT — PAIN SCALES - GENERAL
PAINLEVEL_OUTOF10: 7
PAINLEVEL_OUTOF10: 7

## 2023-05-11 ASSESSMENT — PAIN DESCRIPTION - LOCATION
LOCATION: ARM
LOCATION: SHOULDER

## 2023-05-11 ASSESSMENT — PAIN - FUNCTIONAL ASSESSMENT
PAIN_FUNCTIONAL_ASSESSMENT: PREVENTS OR INTERFERES SOME ACTIVE ACTIVITIES AND ADLS
PAIN_FUNCTIONAL_ASSESSMENT: 0-10

## 2023-05-11 NOTE — ED NOTES
Patient stable at time of discharge. Reviewed discharge instructions, follow up, and medications with patient. Patient verbalized understanding and denied any further questions at time of discharge. Ambulatory out of the ER with steady gait.       Mercedes Guadarrama RN  05/11/23 2121

## 2023-05-11 NOTE — ED NOTES
Bedside shift change report given to Brooklynn (oncoming nurse) by Myself (offgoing nurse). Report included the following information Nurse Handoff Report, SBAR summary.         Andrea Ortiz RN  05/11/23 [id number]

## 2023-05-11 NOTE — ED PROVIDER NOTES
denies trauma or swelling. No indication for imaging at this time. Patient freely moves the neck. She has no midline cervical spine tenderness. Symptoms sound consistent with cervical radiculopathy. Will prescribe short course of steroids, Motrin and muscle relaxers. Recommended close follow-up with orthopedic surgery and family medicine. Discussed my clinical impression(s), any labs and/or radiology results with the patient. I answered any questions and addressed any concerns. Discussed the importance of following up with their primary care physician and/or specialist(s). Discussed signs or symptoms that would warrant return back to the ER for further evaluation. The patient is agreeable with discharge. Risk  Prescription drug management. EKG: All EKG's are interpreted by the Emergency Department Physician who either signs or Co-signs this chart in the absence of a cardiologist.           Guadalupe Ortiz 124 time was 0 minutes, excluding separately reportable procedures. There was a high probability of clinically significant/life threatening deterioration in the patient's condition which required my urgent intervention. CONSULTS:  None    PROCEDURES:  Unless otherwise noted below, none     Procedures    FINAL IMPRESSION      1. Cervical radiculopathy          DISPOSITION/PLAN   DISPOSITION        PATIENT REFERRED TO:  Melly Cuevas MD  [address] [place] [address]  [phone]    Schedule an appointment as soon as possible for a visit       Aurea Auguste  [address]  [phone]  Schedule an appointment as soon as possible for a visit in 1 day        DISCHARGE MEDICATIONS:  New Prescriptions    No medications on file     Controlled Substances Monitoring:     No flowsheet data found.     (Please note that portions of this note were completed with a voice recognition program.  Efforts were made to edit

## 2023-05-17 ENCOUNTER — TELEPHONE (OUTPATIENT)
Age: 43
End: 2023-05-17

## 2023-06-01 ENCOUNTER — TELEMEDICINE (OUTPATIENT)
Age: 43
End: 2023-06-01
Payer: COMMERCIAL

## 2023-06-01 DIAGNOSIS — M47.812 FACET ARTHROPATHY, CERVICAL: ICD-10-CM

## 2023-06-01 DIAGNOSIS — M54.12 LEFT CERVICAL RADICULOPATHY: ICD-10-CM

## 2023-06-01 DIAGNOSIS — M50.30 DDD (DEGENERATIVE DISC DISEASE), CERVICAL: ICD-10-CM

## 2023-06-01 DIAGNOSIS — I80.8 SUPERFICIAL THROMBOPHLEBITIS OF LEFT UPPER EXTREMITY: Primary | ICD-10-CM

## 2023-06-01 PROCEDURE — 99215 OFFICE O/P EST HI 40 MIN: CPT | Performed by: FAMILY MEDICINE

## 2023-06-01 RX ORDER — IBUPROFEN 600 MG/1
600 TABLET ORAL EVERY 6 HOURS PRN
COMMUNITY

## 2023-06-01 RX ORDER — GABAPENTIN 100 MG/1
100 CAPSULE ORAL 2 TIMES DAILY
COMMUNITY

## 2023-06-01 RX ORDER — FLUOXETINE 10 MG/1
CAPSULE ORAL DAILY
COMMUNITY
Start: 2023-05-15

## 2023-06-01 RX ORDER — SOLIFENACIN SUCCINATE 5 MG/1
10 TABLET, FILM COATED ORAL DAILY
COMMUNITY

## 2023-06-01 ASSESSMENT — ENCOUNTER SYMPTOMS
GASTROINTESTINAL NEGATIVE: 1
SHORTNESS OF BREATH: 0
RESPIRATORY NEGATIVE: 1

## 2023-06-01 NOTE — PROGRESS NOTES
VIRTUAL VISIT    Patient seen via virtual visit today for the following:  Chief Complaint   Patient presents with    ED Follow-up     [facility] ER 5-11-23 left arm pain, superficial thrombophlebitis       HISTORY OF PRESENT ILLNESS   HPI  Patient presents for follow up from [facility] ER 5-11-23 after presenting there w/ 4 day history left arm and left sided neck pain, being diagnosed w/ cervical radiculopathy and superficial thrombophlebitis of the left upper arm. She awoke on the morning of 5/8 w/ pain in her left upper arm. Felt like a soreness, aching and as if she slept wrong on it. Later that day a friend told her that the area appeared a little swollen on the upper outer part of the arm and when she touched it, it felt sore, tender, warm and nodular. There was no redness. She later felt some sharp, shooting pains going up and down her arm. As the days went on the pain extended from her left arm into the back of the left side of her neck and upper back. Felt like tight, sore muscles. The pain also radiated down her entire arm and felt like a throbbing pain. It hurt to raise her arm and she had a hard time lifting it above shoulder level. The arm felt weak, sore, tired and numb. She has history of similar symptoms dating back to 2015 which was diagnosed and treated by ortho and pain mgt as detailed in problem list below. As the days went on the symptoms progressed so she ended up going to the ER on 5-11-23. She denied any preceding injury or trauma. No initial imaging or laboratory studies were obtained. She was told she likely had a pinched nerve and was given an injection of Toradol 30 mg. She was dc'd w/ rx's for Flexeril 10 mg, Motrin 600 mg and Medrol Dosepack taper and advised to follow up w/ Ortho.  Prior to dc pt expressed her concern about the other isolated area in her left upper arm that felt swollen, warm and tender to touch so a venous doppler was obtained and consistent with superficial thrombophlebitis of the

## 2023-06-04 DIAGNOSIS — F41.0 PANIC DISORDER (EPISODIC PAROXYSMAL ANXIETY): ICD-10-CM

## 2023-06-04 DIAGNOSIS — F43.23 ADJUSTMENT REACTION WITH ANXIETY AND DEPRESSION: Primary | ICD-10-CM

## 2023-06-04 DIAGNOSIS — F43.23 ADJUSTMENT DISORDER WITH MIXED ANXIETY AND DEPRESSED MOOD: ICD-10-CM

## 2023-06-06 RX ORDER — FLUOXETINE 10 MG/1
CAPSULE ORAL
Qty: 90 CAPSULE | Refills: 1 | Status: SHIPPED | OUTPATIENT
Start: 2023-06-06

## 2023-06-06 NOTE — TELEPHONE ENCOUNTER
Chief Complaint   Patient presents with    Medication Refill     Last Office Visit 05/18/2023  Next Follow Up  06/08/2023

## 2023-06-07 ENCOUNTER — HOSPITAL ENCOUNTER (OUTPATIENT)
Facility: HOSPITAL | Age: 43
Discharge: HOME | End: 2023-06-09
Attending: FAMILY MEDICINE
Payer: COMMERCIAL

## 2023-06-07 DIAGNOSIS — I80.8 SUPERFICIAL THROMBOPHLEBITIS OF LEFT UPPER EXTREMITY: ICD-10-CM

## 2023-06-07 PROCEDURE — 93971 EXTREMITY STUDY: CPT

## 2023-06-08 ENCOUNTER — APPOINTMENT (OUTPATIENT)
Facility: HOSPITAL | Age: 43
End: 2023-06-08
Payer: COMMERCIAL

## 2023-06-08 ENCOUNTER — HOSPITAL ENCOUNTER (EMERGENCY)
Facility: HOSPITAL | Age: 43
Discharge: HOME | End: 2023-06-08
Attending: EMERGENCY MEDICINE
Payer: COMMERCIAL

## 2023-06-08 ENCOUNTER — NURSE TRIAGE (OUTPATIENT)
Dept: OTHER | Facility: CLINIC | Age: 43
End: 2023-06-08

## 2023-06-08 ENCOUNTER — TELEPHONE (OUTPATIENT)
Age: 43
End: 2023-06-08

## 2023-06-08 VITALS
TEMPERATURE: 97.8 F | SYSTOLIC BLOOD PRESSURE: 147 MMHG | BODY MASS INDEX: 27.71 KG/M2 | DIASTOLIC BLOOD PRESSURE: 85 MMHG | OXYGEN SATURATION: 100 % | HEIGHT: 62 IN | RESPIRATION RATE: 20 BRPM | WEIGHT: 150.57 LBS | HEART RATE: 85 BPM

## 2023-06-08 DIAGNOSIS — N39.0 UTI (URINARY TRACT INFECTION), UNCOMPLICATED: ICD-10-CM

## 2023-06-08 DIAGNOSIS — R07.9 CHEST PAIN, UNSPECIFIED TYPE: Primary | ICD-10-CM

## 2023-06-08 LAB
ALBUMIN SERPL-MCNC: 3.7 G/DL (ref 3.5–5)
ALBUMIN/GLOB SERPL: 1.1 (ref 1.1–2.2)
ALP SERPL-CCNC: 54 U/L (ref 45–117)
ALT SERPL-CCNC: 23 U/L (ref 12–78)
ANION GAP SERPL CALC-SCNC: 2 MMOL/L (ref 5–15)
APPEARANCE UR: CLEAR
AST SERPL-CCNC: 14 U/L (ref 15–37)
BACTERIA URNS QL MICRO: ABNORMAL /HPF
BASOPHILS # BLD: 0 K/UL (ref 0–0.1)
BASOPHILS NFR BLD: 1 % (ref 0–1)
BILIRUB SERPL-MCNC: 0.3 MG/DL (ref 0.2–1)
BILIRUB UR QL: NEGATIVE
BUN SERPL-MCNC: 12 MG/DL (ref 6–20)
BUN/CREAT SERPL: 15 (ref 12–20)
CALCIUM SERPL-MCNC: 8.8 MG/DL (ref 8.5–10.1)
CHLORIDE SERPL-SCNC: 109 MMOL/L (ref 97–108)
CO2 SERPL-SCNC: 26 MMOL/L (ref 21–32)
COLOR UR: ABNORMAL
COMMENT:: NORMAL
CREAT SERPL-MCNC: 0.82 MG/DL (ref 0.55–1.02)
D DIMER PPP FEU-MCNC: 0.35 MG/L FEU (ref 0–0.65)
DIFFERENTIAL METHOD BLD: ABNORMAL
EOSINOPHIL # BLD: 0.1 K/UL (ref 0–0.4)
EOSINOPHIL NFR BLD: 4 % (ref 0–7)
EPITH CASTS URNS QL MICRO: ABNORMAL /LPF
ERYTHROCYTE [DISTWIDTH] IN BLOOD BY AUTOMATED COUNT: 13.6 % (ref 11.5–14.5)
GLOBULIN SER CALC-MCNC: 3.5 G/DL (ref 2–4)
GLUCOSE SERPL-MCNC: 81 MG/DL (ref 65–100)
GLUCOSE UR STRIP.AUTO-MCNC: NEGATIVE MG/DL
HCG UR QL: NEGATIVE
HCT VFR BLD AUTO: 41.5 % (ref 35–47)
HGB BLD-MCNC: 12.8 G/DL (ref 11.5–16)
HGB UR QL STRIP: NEGATIVE
HYALINE CASTS URNS QL MICRO: ABNORMAL /LPF (ref 0–5)
IMM GRANULOCYTES # BLD AUTO: 0 K/UL (ref 0–0.04)
IMM GRANULOCYTES NFR BLD AUTO: 0 % (ref 0–0.5)
KETONES UR QL STRIP.AUTO: NEGATIVE MG/DL
LEUKOCYTE ESTERASE UR QL STRIP.AUTO: ABNORMAL
LIPASE SERPL-CCNC: 245 U/L (ref 73–393)
LYMPHOCYTES # BLD: 1.1 K/UL (ref 0.8–3.5)
LYMPHOCYTES NFR BLD: 33 % (ref 12–49)
MCH RBC QN AUTO: 26.7 PG (ref 26–34)
MCHC RBC AUTO-ENTMCNC: 30.8 G/DL (ref 30–36.5)
MCV RBC AUTO: 86.5 FL (ref 80–99)
MONOCYTES # BLD: 0.4 K/UL (ref 0–1)
MONOCYTES NFR BLD: 11 % (ref 5–13)
NEUTS SEG # BLD: 1.8 K/UL (ref 1.8–8)
NEUTS SEG NFR BLD: 51 % (ref 32–75)
NITRITE UR QL STRIP.AUTO: NEGATIVE
NRBC # BLD: 0 K/UL (ref 0–0.01)
NRBC BLD-RTO: 0 PER 100 WBC
PH UR STRIP: 6.5 (ref 5–8)
PLATELET # BLD AUTO: 229 K/UL (ref 150–400)
PMV BLD AUTO: 10.7 FL (ref 8.9–12.9)
POTASSIUM SERPL-SCNC: 4.2 MMOL/L (ref 3.5–5.1)
PROT SERPL-MCNC: 7.2 G/DL (ref 6.4–8.2)
PROT UR STRIP-MCNC: NEGATIVE MG/DL
RBC # BLD AUTO: 4.8 M/UL (ref 3.8–5.2)
RBC #/AREA URNS HPF: ABNORMAL /HPF (ref 0–5)
SODIUM SERPL-SCNC: 137 MMOL/L (ref 136–145)
SP GR UR REFRACTOMETRY: 1.01 (ref 1–1.03)
SPECIMEN HOLD: NORMAL
TROPONIN I SERPL HS-MCNC: <3 NG/L (ref 0–37)
UROBILINOGEN UR QL STRIP.AUTO: 0.2 EU/DL (ref 0.2–1)
WBC # BLD AUTO: 3.5 K/UL (ref 3.6–11)
WBC URNS QL MICRO: ABNORMAL /HPF (ref 0–4)

## 2023-06-08 PROCEDURE — 93005 ELECTROCARDIOGRAM TRACING: CPT | Performed by: EMERGENCY MEDICINE

## 2023-06-08 PROCEDURE — 85025 COMPLETE CBC W/AUTO DIFF WBC: CPT

## 2023-06-08 PROCEDURE — 81001 URINALYSIS AUTO W/SCOPE: CPT

## 2023-06-08 PROCEDURE — 96374 THER/PROPH/DIAG INJ IV PUSH: CPT

## 2023-06-08 PROCEDURE — 84484 ASSAY OF TROPONIN QUANT: CPT

## 2023-06-08 PROCEDURE — 81025 URINE PREGNANCY TEST: CPT

## 2023-06-08 PROCEDURE — 80053 COMPREHEN METABOLIC PANEL: CPT

## 2023-06-08 PROCEDURE — 71046 X-RAY EXAM CHEST 2 VIEWS: CPT

## 2023-06-08 PROCEDURE — 87186 SC STD MICRODIL/AGAR DIL: CPT

## 2023-06-08 PROCEDURE — 96375 TX/PRO/DX INJ NEW DRUG ADDON: CPT

## 2023-06-08 PROCEDURE — 87077 CULTURE AEROBIC IDENTIFY: CPT

## 2023-06-08 PROCEDURE — 87086 URINE CULTURE/COLONY COUNT: CPT

## 2023-06-08 PROCEDURE — 6360000002 HC RX W HCPCS: Performed by: EMERGENCY MEDICINE

## 2023-06-08 PROCEDURE — 83690 ASSAY OF LIPASE: CPT

## 2023-06-08 PROCEDURE — 36415 COLL VENOUS BLD VENIPUNCTURE: CPT

## 2023-06-08 PROCEDURE — 99285 EMERGENCY DEPT VISIT HI MDM: CPT

## 2023-06-08 PROCEDURE — 85379 FIBRIN DEGRADATION QUANT: CPT

## 2023-06-08 RX ORDER — ONDANSETRON 2 MG/ML
4 INJECTION INTRAMUSCULAR; INTRAVENOUS EVERY 6 HOURS PRN
Status: DISCONTINUED | OUTPATIENT
Start: 2023-06-08 | End: 2023-06-08 | Stop reason: HOSPADM

## 2023-06-08 RX ORDER — DEXAMETHASONE SODIUM PHOSPHATE 10 MG/ML
10 INJECTION, SOLUTION INTRAMUSCULAR; INTRAVENOUS
Status: COMPLETED | OUTPATIENT
Start: 2023-06-08 | End: 2023-06-08

## 2023-06-08 RX ORDER — KETOROLAC TROMETHAMINE 30 MG/ML
15 INJECTION, SOLUTION INTRAMUSCULAR; INTRAVENOUS
Status: COMPLETED | OUTPATIENT
Start: 2023-06-08 | End: 2023-06-08

## 2023-06-08 RX ORDER — NITROFURANTOIN 25; 75 MG/1; MG/1
100 CAPSULE ORAL 2 TIMES DAILY
Qty: 10 CAPSULE | Refills: 0 | Status: SHIPPED | OUTPATIENT
Start: 2023-06-08 | End: 2023-06-13

## 2023-06-08 RX ADMIN — KETOROLAC TROMETHAMINE 15 MG: 30 INJECTION, SOLUTION INTRAMUSCULAR; INTRAVENOUS at 16:38

## 2023-06-08 RX ADMIN — ONDANSETRON 4 MG: 2 INJECTION INTRAMUSCULAR; INTRAVENOUS at 16:38

## 2023-06-08 RX ADMIN — DEXAMETHASONE SODIUM PHOSPHATE 10 MG: 10 INJECTION INTRAMUSCULAR; INTRAVENOUS at 16:38

## 2023-06-08 ASSESSMENT — ENCOUNTER SYMPTOMS
ABDOMINAL PAIN: 0
SHORTNESS OF BREATH: 0
NAUSEA: 0
TROUBLE SWALLOWING: 0
VOMITING: 0
COUGH: 0
DIARRHEA: 0

## 2023-06-08 ASSESSMENT — PAIN - FUNCTIONAL ASSESSMENT: PAIN_FUNCTIONAL_ASSESSMENT: 0-10

## 2023-06-08 ASSESSMENT — PAIN DESCRIPTION - LOCATION: LOCATION: CHEST

## 2023-06-08 ASSESSMENT — PAIN DESCRIPTION - PAIN TYPE: TYPE: ACUTE PAIN

## 2023-06-08 ASSESSMENT — PAIN DESCRIPTION - ORIENTATION: ORIENTATION: MID

## 2023-06-08 ASSESSMENT — PAIN DESCRIPTION - DESCRIPTORS: DESCRIPTORS: DULL;HEAVINESS

## 2023-06-08 ASSESSMENT — PAIN SCALES - GENERAL: PAINLEVEL_OUTOF10: 6

## 2023-06-08 NOTE — ED TRIAGE NOTES
Pt comes comes to ED from [facility] with reports of CP since Sunday. Pt states she went to [facility] on Sunday for same c/o. States she was told her EKG was normal and was dx costochondritis. Pt states she did not have blood work done. At this time, pt states take a deep breath.

## 2023-06-08 NOTE — TELEPHONE ENCOUNTER
this encounter as the response is not directed to a shared pool.         Reason for Disposition   Chest pain lasting longer than 5 minutes and ANY of the following:* Over 40years old* Over 27years old and at least one cardiac risk factor (e.g., diabetes mellitus, high blood pressure, high cholesterol, smoker, or strong family history of heart disease)* History of heart disease (i.e., angina, heart attack, heart failure, bypass surgery, takes nitroglycerin)* Pain is crushing, pressure-like, or heavy    Protocols used: Chest Pain-ADULT-[place]

## 2023-06-08 NOTE — ED PROVIDER NOTES
[facility] EMERGENCY DEP  EMERGENCY DEPARTMENT ENCOUNTER      Pt Name: Kaitlynn Mcconnell  MRN: [id number]  Armsalvarezgftony 1980  Date of evaluation: 6/8/2023  Provider: MAT Mcneal NP    CHIEF COMPLAINT       Chief Complaint   Patient presents with    Chest Pain         HISTORY OF PRESENT ILLNESS   (Location/Symptom, Timing/Onset, Context/Setting, Quality, Duration, Modifying Factors, Severity)  Note limiting factors. HPI patient is a 42-year-old female with extensive past medical history see list below who presents to the ED with midsternal area chest pain that started abruptly Sunday evening. She was evaluated at [facility] ED on Monday and diagnosed with costochondritis. She was told to take NSAIDs which she has taken for the past 2 days without relief. Denies fever, cough,cold symptoms, neck pain, visual changes, focal weakness or rash. Denies any difficulty breathing, difficulty swallowing, SOB or abdominal pain. Denies any nausea, vomiting or diarrhea. Denies any falls, heavy pushing, pulling or lifting. Old charts reviewed. Review of External Medical Records:     Nursing Notes were reviewed. REVIEW OF SYSTEMS    (2-9 systems for level 4, 10 or more for level 5)     Review of Systems   Constitutional:  Negative for activity change, appetite change, fever and unexpected weight change. HENT:  Negative for congestion and trouble swallowing. Eyes:  Negative for visual disturbance. Respiratory:  Negative for cough and shortness of breath. Cardiovascular:  Positive for chest pain. Negative for palpitations and leg swelling. Gastrointestinal:  Negative for abdominal pain, diarrhea, nausea and vomiting. Genitourinary:  Negative for dysuria. Musculoskeletal:  Negative for arthralgias, gait problem and myalgias. Skin:  Negative for rash. Neurological:  Negative for headaches. All other systems reviewed and are negative.     Except as noted above the remainder of the review of

## 2023-06-09 LAB
EKG ATRIAL RATE: 82 BPM
EKG DIAGNOSIS: NORMAL
EKG P AXIS: 70 DEGREES
EKG P-R INTERVAL: 170 MS
EKG Q-T INTERVAL: 352 MS
EKG QRS DURATION: 72 MS
EKG QTC CALCULATION (BAZETT): 411 MS
EKG R AXIS: 35 DEGREES
EKG T AXIS: 26 DEGREES
EKG VENTRICULAR RATE: 82 BPM

## 2023-06-09 PROCEDURE — 93010 ELECTROCARDIOGRAM REPORT: CPT | Performed by: INTERNAL MEDICINE

## 2023-06-10 LAB
BACTERIA SPEC CULT: ABNORMAL
BACTERIA SPEC CULT: ABNORMAL
CC UR VC: ABNORMAL
SERVICE CMNT-IMP: ABNORMAL

## 2023-06-12 ENCOUNTER — TELEPHONE (OUTPATIENT)
Age: 43
End: 2023-06-12

## 2023-06-12 NOTE — TELEPHONE ENCOUNTER
----- Message from Marissa Westbrookr sent at 6/9/2023 11:13 AM EDT -----  Subject: Appointment Request    Reason for Call: Established Patient Appointment needed: Routine ED Follow   Up Visit    QUESTIONS    Reason for appointment request? No appointments available during search     Additional Information for Provider?  ED F/U, please call to schedule  ---------------------------------------------------------------------------  --------------  Nate GALAN  [phone]; OK to leave message on voicemail  ---------------------------------------------------------------------------  --------------  SCRIPT ANSWERS

## 2023-06-12 NOTE — TELEPHONE ENCOUNTER
KRISTIEM  on 6.12.23 to callback the office. Pt need's an Ed Follow-up,pt was seen at [facility] ED on 6.9.23 for Chest Pain and UTI. Pt need's to have an ED Follow-up with .

## 2023-06-30 NOTE — TELEPHONE ENCOUNTER
Dinorah Tolentino MD    [facility]  [address]  [place] [address]  Phone: [phone]  Fax: [phone]     History of Present Illness     Subjective     Patient ID: Nohemy Machuca is a 65 y.o. female.    Patient comes in to follow up on weight, abdominal pain, early satiety.    Has topical CBD for knees, somewhat helpful  Applied for Medical Marijuana card  Tried buspirone before meals 2.5 mg three times per day, feels it helped with abdominal pain.  Stopped it because not in pain anymore.  Unsure if it helped with hunger but continues not to feel hungry  Rheumatologist advised eating butter, sour cream.  Seems to be in a flare from joints now.  Will see derm for nail pain.  Doing bloodwork every month for rheumatologist Dr. Anne, sees Michelle Saldana.       Past Medical/Surgical/Family/Social History       The following have been reviewed and updated as appropriate in this visit:          Past Medical History:   Diagnosis Date   • Bladder cancer (CMS/HCC)    • Cerebral cavernoma    • Constipation    • History of tinnitus    • Osteoporosis        Past Surgical History:   Procedure Laterality Date   • BUNIONECTOMY Bilateral    • RECTAL PROLAPSE REPAIR     • SUBTOTAL COLECTOMY     • TONSILLECTOMY         Family History   Problem Relation Age of Onset   • Diverticulitis Biological Mother    • Glaucoma Biological Father        Social History     Tobacco Use   • Smoking status: Never   • Smokeless tobacco: Never   Substance Use Topics   • Alcohol use: Yes     Comment: events   • Drug use: Never       Patient Care Team:  Dinorah Tolentino MD as PCP - General (Internal Medicine)  Marva Damon MD as Consulting Physician (Neurology)     Allergies and Medications       Allergies   Allergen Reactions   • Sulfa (Sulfonamide Antibiotics)      Other reaction(s): Unknown       Current Outpatient Medications   Medication Sig Dispense Refill   • alendronate 70 mg  Patient called having tingling and numbness in finger, toes and feet. Want the nurse to give her a cll.     Best call back #[phone] tablet Take 70 mg by mouth once a week.     • aspirin 81 mg enteric coated tablet Take 81 mg by mouth daily.     • busPIRone (BUSPAR) 5 mg tablet Take 1 tablet (5 mg total) by mouth 3 (three) times a day. 30-60 minutes before a meal 270 tablet 1   • cholecalciferol, vitamin D3, 10 mcg (400 unit) capsule Take by mouth.     • estradioL (ESTRACE) 0.01 % (0.1 mg/gram) vaginal cream Apply 1 g into the vagina three times per week at bedtime 127.5 g 3   • gabapentin (NEURONTIN) 100 mg capsule Take 100 mg by mouth 2 (two) times a day.     • levocetirizine (XYZAL) 5 mg tablet Take 1 tablet (5 mg total) by mouth every evening. 90 tablet 1   • MOTEGRITY 2 mg tablet Take 2 mg by mouth once daily.     • multivit with minerals/lutein (MULTIVITAMIN 50 PLUS ORAL) Take by mouth.     • polyethylene glycol (MIRALAX) 17 gram/dose powder Take 17 g by mouth daily.     • hydrocortisone 2.5 % cream Apply topically 2 (two) times a day. (Patient not taking: Reported on 3/17/2023) 30 g 1   • lidocaine (LIDODERM) 5 % patch Place 1 patch on the skin daily. Remove & discard patch within 12 hours or as directed by prescriber. (Patient taking differently: Place 1 patch on the skin daily as needed. Remove & discard patch within 12 hours or as directed by prescriber.) 30 patch 1     No current facility-administered medications for this visit.          Review of Systems       Review of Systems   Constitutional: Positive for appetite change.   Gastrointestinal: Negative for abdominal pain.        Physical Examination       Objective     Vitals:    06/30/23 0858   BP: 104/64   Pulse: 63   Resp: 15   SpO2: 99%       Pulse Readings from Last 5 Encounters:   06/30/23 63   04/19/23 60   03/17/23 68   02/02/23 (!) 57   08/19/22 (!) 57       Wt Readings from Last 5 Encounters:   06/30/23 41.7 kg (92 lb)   03/17/23 43.6 kg (96 lb 3.2 oz)   02/02/23 43.9 kg (96 lb 12.8 oz)   01/05/23 44.3 kg (97 lb 9.6 oz)   12/02/22 42.9 kg (94 lb 9.6 oz)       Ht Readings  "from Last 5 Encounters:   06/30/23 1.651 m (5' 5\")   03/17/23 1.651 m (5' 5\")   02/02/23 1.651 m (5' 5\")   01/05/23 1.638 m (5' 4.5\")   12/02/22 1.638 m (5' 4.5\")       BP Readings from Last 5 Encounters:   06/30/23 104/64   04/19/23 98/62   03/17/23 101/62   02/02/23 102/60   01/05/23 105/62       BMI Readings from Last 4 Encounters:   06/30/23 15.31 kg/m²   03/17/23 16.01 kg/m²   02/02/23 16.11 kg/m²   01/05/23 16.49 kg/m²       Physical Exam  Constitutional:       Appearance: Normal appearance.   HENT:      Nose: Nose normal.      Mouth/Throat:      Mouth: Mucous membranes are moist.   Eyes:      Conjunctiva/sclera: Conjunctivae normal.   Cardiovascular:      Rate and Rhythm: Normal rate and regular rhythm.      Heart sounds: Normal heart sounds. No murmur heard.  Pulmonary:      Effort: Pulmonary effort is normal.      Breath sounds: Normal breath sounds. No wheezing.   Abdominal:      Palpations: Abdomen is soft.      Tenderness: There is no abdominal tenderness.   Neurological:      Mental Status: She is alert.   Psychiatric:         Mood and Affect: Mood normal.         Behavior: Behavior normal.         Thought Content: Thought content normal.          Laboratory Results     Lab Results   Component Value Date    WBC 4.90 04/17/2021    WBC 8.45 04/16/2021    WBC 6.02 09/18/2020    HGB 13.1 04/17/2021    HGB 14.4 04/16/2021    HGB 13.0 09/18/2020    HCT 38.3 04/17/2021    HCT 40.3 04/16/2021    HCT 38.5 09/18/2020    MCV 94.8 04/17/2021    MCV 91.4 04/16/2021    MCV 99.7 (H) 09/18/2020     04/17/2021     04/16/2021     09/18/2020        Lab Results   Component Value Date    GLUCOSE 87 04/17/2021    GLUCOSE 91 04/17/2021    GLUCOSE 88 04/16/2021    CALCIUM 9.0 04/17/2021    CALCIUM 9.3 04/17/2021    CALCIUM 9.0 04/16/2021     (L) 04/17/2021     04/17/2021     (L) 04/16/2021    K 4.3 04/17/2021    K 4.4 04/17/2021    K 3.9 04/16/2021    CO2 26 04/17/2021    CO2 28 " 04/17/2021    CO2 25 04/16/2021     04/17/2021     04/17/2021     04/16/2021    BUN 15 04/17/2021    BUN 12 04/17/2021    BUN 15 04/16/2021    CREATININE 0.6 04/17/2021    CREATININE 0.5 (L) 04/17/2021    CREATININE 0.5 (L) 04/16/2021    ALKPHOS 70 04/16/2021    ALKPHOS 51 09/17/2017    ALKPHOS 67 03/16/2017    AST 38 04/16/2021    AST 31 09/17/2017    AST 32 03/16/2017    ALT 30 04/16/2021    ALT 26 09/17/2017    ALT 25 03/16/2017    BILITOT 0.6 04/16/2021    BILITOT 0.6 09/17/2017    BILITOT 0.4 03/16/2017    ALBUMIN 4.8 04/16/2021    ALBUMIN 4.5 09/17/2017    ALBUMIN 4.4 03/16/2017       Lab Results   Component Value Date    CHOL 193 09/22/2022    CHOL 191 04/17/2021    CHOL 209 (H) 09/18/2017     Lab Results   Component Value Date    HDL 98 09/22/2022    HDL 71 04/17/2021    HDL 81 09/18/2017     Lab Results   Component Value Date    LDLCALC 86 09/22/2022    LDLCALC 113 (H) 04/17/2021    LDLCALC 117 (H) 09/18/2017     Lab Results   Component Value Date    TRIG 44 09/22/2022    TRIG 37 04/17/2021    TRIG 54 09/18/2017       The 10-year ASCVD risk score (Renee DK, et al., 2019) is: 2.9%    Values used to calculate the score:      Age: 65 years      Sex: Female      Is Non- : No      Diabetic: No      Tobacco smoker: No      Systolic Blood Pressure: 104 mmHg      Is BP treated: No      HDL Cholesterol: 98 mg/dL      Total Cholesterol: 193 mg/dL    Lab Results   Component Value Date    TSH 4.56 04/16/2021    TSH 2.34 01/04/2021    TSH 1.69 09/17/2017     No results found for: FREET4      Lab Results   Component Value Date    HGBA1C 4.8 04/17/2021       No results found for: CREATUR  No results found for: MICROALBUR  No results found for: ALBCRET    No results found for: HEPCAB    Lab Results   Component Value Date    MG 2.0 09/17/2017            Immunization History   Administered Date(s) Administered   • INFLUENZA VACCINE QUAD ADJUVANTED 65 and OLDER 12/02/2022   •  Influenza Vaccine Quadrivalent 3 Yr And Older 11/01/2016   • Influenza Vaccine Quadrivalent Preservative Free 6 Mons and Up 11/08/2019   • Influenza, Unspecified 11/01/2016, 11/15/2016   • Pneumococcal Conjugate 13-Valent 09/27/2021   • Pneumococcal Polysaccharide 10/02/2022   • SARS-COV-2 (COVID-19) Unspecified Immunization 03/22/2021   • SARS-COV-2 (COVID19) VACCINE, PFIZER MONOVALENT 03/22/2021   • Sars-cov-2 (Covid-19) Vaccine, Fabio 04/07/2020, 03/22/2021   • Tdap 08/26/2015         Health Maintenance Topics with due status: Overdue       Topic Date Due    Colorectal Cancer Screening Never done    HIV Screening Never done    Zoster Vaccine Never done    Cervical Cancer Screening Never done    Hepatitis B Vaccines Never done    COVID-19 Vaccine 05/17/2021    Falls Risk Screening Never done     Health Maintenance Topics with due status: Not Due       Topic Last Completion Date    DTaP, Tdap, and Td Vaccines 08/26/2015    Breast Cancer Screening 07/27/2021    DEXA Scan 10/27/2021    Influenza Vaccine 12/02/2022    Depression Screening 04/19/2023     Health Maintenance Topics with due status: Completed       Topic Last Completion Date    Pneumococcal (65 years and older) 10/02/2022     Health Maintenance Topics with due status: Aged Out       Topic Date Due    Meningococcal ACWY Aged Out    HIB Vaccines Aged Out    IPV Vaccines Aged Out    HPV Vaccines Aged Out        Assessment and Plan       Assessment/Plan     Problem List Items Addressed This Visit        Nervous    Generalized postprandial abdominal pain - Primary    Overview     Dr. [facility]  Pelvic floor PT  Last colonoscopy 3/2022         Current Assessment & Plan     Patient with chronic constipation seeing nutritionist and motility specialsit had severe pain and diarrhea after eating-avoided doing so.  Weight is low.  She is working with a nutritionist, pelvic floor PT, motility specialist and gastroenterologist.  We added buspirone 2.5 mg before  meals to see if this would help with gastric relaxation.  She is unsure if it helped but pain resolved and she stopped the medication a couple of weeks ago-still doing well.  - of note pt reports she was on this in the past for another condition and it resulted in an episode of syncope.    -  Advised pt to stay on the buspirone to see if this can help with her early satiety and huger, increase to 5 mg TID since tolerating and likely some anxiety component  - Continue to see GI  - Pt has applied for a medical marijuana card-I agree with trying this  - Continue with pelvic floor PT  - FU in november            Return for FU in November when I am back.    No orders of the defined types were placed in this encounter.             Dinorah Tolentino MD    7/1/2023

## 2023-07-28 NOTE — TELEPHONE ENCOUNTER
Patient is calling stating that the RX that was prescribed is not helping her, IMITREX, did not see this on the pts medication list. Pt states that she is no longer taking it, pt states that it feels like her throat is tightening up. Pt is requesting an alternative, or patient states she will get something over the counter.         Best callback:  [phone]      LOV:  Friday, February 15, 2019 · Personally evaluated. Unable to obtain dopplerable dorsalis pedus b/l. + posterior tibialis.    · Distal extremities cool, mottled and discolored w/ presence of chronic venous stasis changes  · Routine arterial duplex study ordered by admitting team. Order changed to stat and I called the vascular tech to perform BRYNN ASAP  ·

## 2023-12-08 ENCOUNTER — TELEMEDICINE (OUTPATIENT)
Age: 43
End: 2023-12-08
Payer: COMMERCIAL

## 2023-12-08 DIAGNOSIS — F90.0 ADHD (ATTENTION DEFICIT HYPERACTIVITY DISORDER), INATTENTIVE TYPE: ICD-10-CM

## 2023-12-08 DIAGNOSIS — F41.9 ANXIETY AND DEPRESSION: Primary | ICD-10-CM

## 2023-12-08 DIAGNOSIS — F41.1 GENERALIZED ANXIETY DISORDER WITH PANIC ATTACKS: ICD-10-CM

## 2023-12-08 DIAGNOSIS — F32.A ANXIETY AND DEPRESSION: Primary | ICD-10-CM

## 2023-12-08 DIAGNOSIS — F41.0 GENERALIZED ANXIETY DISORDER WITH PANIC ATTACKS: ICD-10-CM

## 2023-12-08 PROBLEM — F43.23 ADJUSTMENT REACTION WITH ANXIETY AND DEPRESSION: Status: ACTIVE | Noted: 2023-04-14

## 2023-12-08 PROCEDURE — 99214 OFFICE O/P EST MOD 30 MIN: CPT | Performed by: FAMILY MEDICINE

## 2023-12-08 RX ORDER — FLUOXETINE HYDROCHLORIDE 20 MG/1
20 CAPSULE ORAL DAILY
Qty: 90 CAPSULE | Refills: 0 | Status: SHIPPED | OUTPATIENT
Start: 2023-12-08

## 2023-12-08 RX ORDER — IBUPROFEN 200 MG
200 TABLET ORAL EVERY 6 HOURS PRN
COMMUNITY

## 2023-12-08 RX ORDER — TROSPIUM CHLORIDE ER 60 MG/1
CAPSULE ORAL DAILY
COMMUNITY
Start: 2023-11-16

## 2023-12-08 ASSESSMENT — PATIENT HEALTH QUESTIONNAIRE - PHQ9
SUM OF ALL RESPONSES TO PHQ9 QUESTIONS 1 & 2: 1
2. FEELING DOWN, DEPRESSED OR HOPELESS: 1
SUM OF ALL RESPONSES TO PHQ QUESTIONS 1-9: 1
1. LITTLE INTEREST OR PLEASURE IN DOING THINGS: 0
SUM OF ALL RESPONSES TO PHQ QUESTIONS 1-9: 1

## 2023-12-08 ASSESSMENT — ENCOUNTER SYMPTOMS
GASTROINTESTINAL NEGATIVE: 1
RESPIRATORY NEGATIVE: 1

## 2023-12-08 NOTE — PROGRESS NOTES
Therapist feels like she has plateaued w/ her medication and advised her to consider titrating dose bc of her increased anxiety as well as feeling low, down at times  Not managing her anxiety as well  Strategies for coping ar \"non-existent right now\"  She knows them but hard time impleementing them n her own. She teaches coing stategies    She cant identify any particular tigrgers but her struggles are daily  Severity varies fluctuates from day to day  No panic attachs but sometimes does feel physical symptoms of anxiety and has to take slow deep breaths and practice mindful awaremeness      She did formal testing for ADD in 9-2023 which she states doesn't surprise her bc she eels like she has dealt w/ this most of her life    Meets w/ therapist ~ q 3 weeks    Endorses varying sleep patterns  The past week not sleeping well  Hard time falling asleep  and other times frequent awakenings bc of anxiety, mind racing      Also not new for her.  Has been to sleep clinic before  Sometimes worse than others  This week just rough  Doesn't take anything for sleep  Tired during the day at times  Still motivated and stays active  Still prductive and getting things done  Endorses occ mild depressed down days, some days lacks motivation to get out of bed when she doesn't feel well rested    Prozac is helping a lot  Not crying all the time now  Anxiety overall is about 50% improved but still very much present   But not having panic attacks anymore like she used which she is very happy about it     New position at [employer] since 9-18-23 which has been a bit overwhelming and stressful   Left [employer] 9-1-23

## 2023-12-08 NOTE — PROGRESS NOTES
VIRTUAL VISIT    Patient seen via virtual visit today for the following:  Chief Complaint   Patient presents with    Anxiety    Depression    Medication Check       HISTORY OF PRESENT ILLNESS   HPI  Patient with history of anxiety, panic disorder, and depression is seen today via virtual visit for follow up and to discuss increasing her Prozac at the recommendation of her therapist. I started her on Prozac 10 mg daily back in the spring when she presented 4-14-23 w/ complaints of stress, anxiety attacks and depressed mood. Since being on the Prozac she does find that it has really helped a lot. Not crying all the time now. Mood more stabilized in general. Anxiety overall is about 50% improved but still very much present. But not having panic attacks anymore like she used which she is very happy about it. She meets routinely w/ her therapist q2-3 weeks which continues to be very helpful. But recently her therapist feels like she has plateaued w/ her medication and advised her to consider titrating dose bc of her increased anxiety as well as feeling low, down and slightly depressed again at times. Not managing her anxiety as well  Strategies for coping are \"non-existent right now\"  She knows them but hard time implementing them on her own. 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 She did start a new position at work which has increased demands, new assignments, and has been a bit overwhelming and stressful making this transition.    But the stress of juggling between 2 jobs, commuting for work, and financial burden has

## 2023-12-09 DIAGNOSIS — F43.23 ADJUSTMENT DISORDER WITH MIXED ANXIETY AND DEPRESSED MOOD: ICD-10-CM

## 2023-12-09 DIAGNOSIS — F41.0 PANIC DISORDER (EPISODIC PAROXYSMAL ANXIETY): ICD-10-CM

## 2023-12-10 RX ORDER — FLUOXETINE 10 MG/1
CAPSULE ORAL
Qty: 90 CAPSULE | Refills: 1 | OUTPATIENT
Start: 2023-12-10

## 2024-01-09 ENCOUNTER — OFFICE VISIT (OUTPATIENT)
Age: 44
End: 2024-01-09
Payer: COMMERCIAL

## 2024-01-09 ENCOUNTER — NURSE TRIAGE (OUTPATIENT)
Dept: OTHER | Facility: CLINIC | Age: 44
End: 2024-01-09

## 2024-01-09 VITALS
TEMPERATURE: 99 F | HEART RATE: 88 BPM | WEIGHT: 154 LBS | SYSTOLIC BLOOD PRESSURE: 112 MMHG | DIASTOLIC BLOOD PRESSURE: 68 MMHG | BODY MASS INDEX: 28.34 KG/M2 | HEIGHT: 62 IN | OXYGEN SATURATION: 100 % | RESPIRATION RATE: 16 BRPM

## 2024-01-09 DIAGNOSIS — R53.83 PROFOUND FATIGUE: Primary | ICD-10-CM

## 2024-01-09 DIAGNOSIS — Z90.710 H/O ABDOMINAL HYSTERECTOMY: ICD-10-CM

## 2024-01-09 LAB
ALBUMIN SERPL-MCNC: 4.1 G/DL (ref 3.5–5)
ALBUMIN/GLOB SERPL: 1.2 (ref 1.1–2.2)
ALP SERPL-CCNC: 61 U/L (ref 45–117)
ALT SERPL-CCNC: 22 U/L (ref 12–78)
ANION GAP SERPL CALC-SCNC: 2 MMOL/L (ref 5–15)
AST SERPL-CCNC: 10 U/L (ref 15–37)
BASOPHILS # BLD: 0 K/UL (ref 0–0.1)
BASOPHILS NFR BLD: 1 % (ref 0–1)
BILIRUB SERPL-MCNC: 0.4 MG/DL (ref 0.2–1)
BUN SERPL-MCNC: 14 MG/DL (ref 6–20)
BUN/CREAT SERPL: 15 (ref 12–20)
CALCIUM SERPL-MCNC: 8.9 MG/DL (ref 8.5–10.1)
CHLORIDE SERPL-SCNC: 108 MMOL/L (ref 97–108)
CO2 SERPL-SCNC: 28 MMOL/L (ref 21–32)
CREAT SERPL-MCNC: 0.93 MG/DL (ref 0.55–1.02)
DIFFERENTIAL METHOD BLD: ABNORMAL
EOSINOPHIL # BLD: 0 K/UL (ref 0–0.4)
EOSINOPHIL NFR BLD: 1 % (ref 0–7)
ERYTHROCYTE [DISTWIDTH] IN BLOOD BY AUTOMATED COUNT: 14.2 % (ref 11.5–14.5)
GLOBULIN SER CALC-MCNC: 3.4 G/DL (ref 2–4)
GLUCOSE SERPL-MCNC: 64 MG/DL (ref 65–100)
HCT VFR BLD AUTO: 40.4 % (ref 35–47)
HGB BLD-MCNC: 12.6 G/DL (ref 11.5–16)
IMM GRANULOCYTES # BLD AUTO: 0 K/UL (ref 0–0.04)
IMM GRANULOCYTES NFR BLD AUTO: 0 % (ref 0–0.5)
LYMPHOCYTES # BLD: 1.1 K/UL (ref 0.8–3.5)
LYMPHOCYTES NFR BLD: 31 % (ref 12–49)
MCH RBC QN AUTO: 26.6 PG (ref 26–34)
MCHC RBC AUTO-ENTMCNC: 31.2 G/DL (ref 30–36.5)
MCV RBC AUTO: 85.2 FL (ref 80–99)
MONOCYTES # BLD: 0.3 K/UL (ref 0–1)
MONOCYTES NFR BLD: 8 % (ref 5–13)
NEUTS SEG # BLD: 2.1 K/UL (ref 1.8–8)
NEUTS SEG NFR BLD: 59 % (ref 32–75)
NRBC # BLD: 0 K/UL (ref 0–0.01)
NRBC BLD-RTO: 0 PER 100 WBC
PLATELET # BLD AUTO: 252 K/UL (ref 150–400)
PMV BLD AUTO: 10.5 FL (ref 8.9–12.9)
POTASSIUM SERPL-SCNC: 3.9 MMOL/L (ref 3.5–5.1)
PROT SERPL-MCNC: 7.5 G/DL (ref 6.4–8.2)
RBC # BLD AUTO: 4.74 M/UL (ref 3.8–5.2)
SODIUM SERPL-SCNC: 138 MMOL/L (ref 136–145)
TSH SERPL DL<=0.05 MIU/L-ACNC: 1.14 UIU/ML (ref 0.36–3.74)
WBC # BLD AUTO: 3.4 K/UL (ref 3.6–11)

## 2024-01-09 PROCEDURE — 99213 OFFICE O/P EST LOW 20 MIN: CPT | Performed by: NURSE PRACTITIONER

## 2024-01-09 ASSESSMENT — PATIENT HEALTH QUESTIONNAIRE - PHQ9
SUM OF ALL RESPONSES TO PHQ9 QUESTIONS 1 & 2: 2
9. THOUGHTS THAT YOU WOULD BE BETTER OFF DEAD, OR OF HURTING YOURSELF: 0
6. FEELING BAD ABOUT YOURSELF - OR THAT YOU ARE A FAILURE OR HAVE LET YOURSELF OR YOUR FAMILY DOWN: 0
8. MOVING OR SPEAKING SO SLOWLY THAT OTHER PEOPLE COULD HAVE NOTICED. OR THE OPPOSITE, BEING SO FIGETY OR RESTLESS THAT YOU HAVE BEEN MOVING AROUND A LOT MORE THAN USUAL: 0
SUM OF ALL RESPONSES TO PHQ QUESTIONS 1-9: 8
2. FEELING DOWN, DEPRESSED OR HOPELESS: 1
3. TROUBLE FALLING OR STAYING ASLEEP: 3
10. IF YOU CHECKED OFF ANY PROBLEMS, HOW DIFFICULT HAVE THESE PROBLEMS MADE IT FOR YOU TO DO YOUR WORK, TAKE CARE OF THINGS AT HOME, OR GET ALONG WITH OTHER PEOPLE: 1
SUM OF ALL RESPONSES TO PHQ QUESTIONS 1-9: 8
4. FEELING TIRED OR HAVING LITTLE ENERGY: 2
SUM OF ALL RESPONSES TO PHQ QUESTIONS 1-9: 8
7. TROUBLE CONCENTRATING ON THINGS, SUCH AS READING THE NEWSPAPER OR WATCHING TELEVISION: 1
5. POOR APPETITE OR OVEREATING: 0
1. LITTLE INTEREST OR PLEASURE IN DOING THINGS: 1
SUM OF ALL RESPONSES TO PHQ QUESTIONS 1-9: 8

## 2024-01-09 ASSESSMENT — ENCOUNTER SYMPTOMS
SHORTNESS OF BREATH: 0
COUGH: 0

## 2024-01-09 NOTE — PROGRESS NOTES
Chief Complaint   Patient presents with    Headache    Fatigue    Dizziness     \"Have you been to the ER, urgent care clinic since your last visit?  Hospitalized since your last visit?\"    NO    “Have you seen or consulted any other health care providers outside of [facility] since your last visit?”    NO        “Have you had a pap smear?”    YES - Where: [facility] Nurse/ASTON to request most recent records if not in the chart

## 2024-01-09 NOTE — PROGRESS NOTES
Assessment/Plan:     1. Profound fatigue  -     Comprehensive Metabolic Panel; Future  -     CBC with Auto Differential; Future  -     Vitamin D 25 Hydroxy; Future  -     TSH; Future  -     Donnell-Barr virus VCA antibody panel; Future  2. H/O abdominal hysterectomy       Etiology unclear.  Labs pending.  Treatment will be based on results.    No follow-ups on file.     Discussed expected course/resolution/complications of diagnosis in detail with patient.    Medication risks/benefits/costs/interactions/alternatives discussed with patient.    Pt was given after visit summary which includes diagnoses, current medications & vitals.   Pt expressed understanding with the diagnosis and plan          Subjective:      Tessy Jansen is a 43 y.o. female who presents for had concerns including Headache, Fatigue, and Dizziness.     Reports a several week history of profound fatigue and dull aching headache.  She has a history of migraines, however these headaches feel different.  Has responded to ibuprofen and tylenol. Work has been affected by her profound fatigue.  No unexpected weight change.  Appetite is down however she is still consuming regular meals.        Patient Active Problem List   Diagnosis    Cervical radiculitis    Vertigo    AR (allergic rhinitis)    Periodic limb movement disorder    Chronic fatigue    TMJ syndrome    Cervicalgia    Syncope    Migraines    Multiple environmental allergies    Multiple food allergies    Uterine prolapse    Mechanical back pain    Irritable bowel syndrome without diarrhea    Chronic nausea    Leukopenia    Situational anxiety    Adjustment reaction with anxiety and depression    Anxiety attack    Facet arthropathy, cervical    Left cervical radiculopathy    ADHD (attention deficit hyperactivity disorder), inattentive type    H/O abdominal hysterectomy       Current Outpatient Medications   Medication Sig Dispense Refill    trospium (SANCTURA) 60 MG CP24 extended release capsule

## 2024-01-09 NOTE — TELEPHONE ENCOUNTER
[facility]    Received call from Fide at [facility] with Red Flag Complaint.    Subjective: Caller states \"I am having extreme exhaustion. I literally can't keep my eyes open. No energy. My head has been hurting every day. This is a regular headache, not a migraine.\"     Current Symptoms: fatigue    Onset: 1 month ago; worsening    Associated Symptoms: daily headache. Nausea. Decreased appetite \"forcing myself to eat.\"    Pain Severity: 3/10; dull; daily    Temperature: denies     What has been tried: naps, increased fluids/water. Ibuprofen/Tylenol for headaches.eating.     LMP:  hysterectomy  Pregnant: No    Recommended disposition: Go to [facility]    Care advice provided, patient verbalizes understanding; denies any other questions or concerns; instructed to call back for any new or worsening symptoms.    Patient/Caller agrees with recommended disposition; writer provided warm transfer to Maria Guadalupe at [facility] for appointment scheduling    Attention Provider:  Thank you for allowing me to participate in the care of your patient.  The patient was connected to triage in response to information provided to the [facility].  Please do not respond through this encounter as the response is not directed to a shared pool.        Reason for Disposition   MODERATE weakness (i.e., interferes with work, school, normal activities) and cause unknown (Exceptions: Weakness from acute minor illness or from poor fluid intake; weakness is chronic and not worse.)    Protocols used: Weakness (Generalized) and Fatigue-ADULT-OH

## 2024-01-10 LAB — 25(OH)D3 SERPL-MCNC: 26.9 NG/ML (ref 30–100)

## 2024-01-11 LAB
EBV VCA IGG SER-ACNC: 570 U/ML (ref 0–17.9)
EBV VCA IGM SER-ACNC: <36 U/ML (ref 0–35.9)

## 2024-01-12 ENCOUNTER — TELEPHONE (OUTPATIENT)
Age: 44
End: 2024-01-12

## 2024-01-15 ENCOUNTER — OFFICE VISIT (OUTPATIENT)
Age: 44
End: 2024-01-15
Payer: COMMERCIAL

## 2024-01-15 VITALS
TEMPERATURE: 98.2 F | HEIGHT: 62 IN | WEIGHT: 155.2 LBS | RESPIRATION RATE: 16 BRPM | BODY MASS INDEX: 28.56 KG/M2 | DIASTOLIC BLOOD PRESSURE: 62 MMHG | SYSTOLIC BLOOD PRESSURE: 108 MMHG | HEART RATE: 88 BPM | OXYGEN SATURATION: 98 %

## 2024-01-15 DIAGNOSIS — Z23 NEEDS FLU SHOT: ICD-10-CM

## 2024-01-15 DIAGNOSIS — R53.83 TIREDNESS: Primary | ICD-10-CM

## 2024-01-15 DIAGNOSIS — E55.9 VITAMIN D DEFICIENCY: ICD-10-CM

## 2024-01-15 DIAGNOSIS — F90.0 ADHD (ATTENTION DEFICIT HYPERACTIVITY DISORDER), INATTENTIVE TYPE: ICD-10-CM

## 2024-01-15 DIAGNOSIS — F41.1 GENERALIZED ANXIETY DISORDER WITH PANIC ATTACKS: ICD-10-CM

## 2024-01-15 DIAGNOSIS — F41.0 GENERALIZED ANXIETY DISORDER WITH PANIC ATTACKS: ICD-10-CM

## 2024-01-15 DIAGNOSIS — Z23 ENCOUNTER FOR ADMINISTRATION OF VACCINE: ICD-10-CM

## 2024-01-15 DIAGNOSIS — F32.A CHRONIC DEPRESSION: ICD-10-CM

## 2024-01-15 PROCEDURE — 90674 CCIIV4 VAC NO PRSV 0.5 ML IM: CPT | Performed by: FAMILY MEDICINE

## 2024-01-15 PROCEDURE — 99214 OFFICE O/P EST MOD 30 MIN: CPT | Performed by: FAMILY MEDICINE

## 2024-01-15 PROCEDURE — 90471 IMMUNIZATION ADMIN: CPT | Performed by: FAMILY MEDICINE

## 2024-01-15 RX ORDER — BUPROPION HYDROCHLORIDE 150 MG/1
150 TABLET ORAL EVERY MORNING
Qty: 30 TABLET | Refills: 0 | Status: SHIPPED | OUTPATIENT
Start: 2024-01-15

## 2024-01-15 ASSESSMENT — ENCOUNTER SYMPTOMS
RESPIRATORY NEGATIVE: 1
GASTROINTESTINAL NEGATIVE: 1
EYES NEGATIVE: 1

## 2024-01-15 NOTE — PROGRESS NOTES
Chief Complaint   Patient presents with    Fatigue     3-4 weeks, seen by NP last week and got labs done    Depression     6 week follow up    Medication Check     HISTORY OF PRESENT ILLNESS   HPI  Patient presents to discuss ongoing fatigue and also happens to be due for her 6 week follow up medication check after her last virtual visit w/ me 12-8-23. At that time we increased her Prozac from 10 to 20 mg daily for depression, anxiety and related cognitive/attention concerns. The past 3 weeks she has been feeling more tired and lethargic and couldn't really figure out why. She forgot about the increase in medication last month and really didn't attribute the recent fatigue to that until I inquired about correlation today. She was in to see MATEO Ramos last week at which time her labs were otherwise stable except slightly low Vitamin D. It was recommended that she start taking OTC Vit D3 supplement but she hasnt started that yet, instead opting to wait until today's appointment.  She feels a profound sense of low energy, lethargy, and chronic fatigue.  No motivation to get up and go.  It is a struggle to do her daily tasks.  Not being effective on her job.  Feels like she is only getting about 15 hrs of work productivity in a 40 hr work week.  Feels like she wants and needs to just stay in bed all day.  She gets in bed at around 5pm every evening but then stays up almost all night and doesn't sleep until around 1 or 2 am.  Sleep is very disrupted. Doesn't feel stimulated or motivated to do anything.  She has been meeting monthly w/ her counselor and was scheduled to see her but didn't feel like going to that appointment so her therapist advised that she keep follow up w/ me.         1/9/2024     1:30 PM   PHQ-9    Little interest or pleasure in doing things 1   Feeling down, depressed, or hopeless 1   Trouble falling or staying asleep, or sleeping too much 3   Feeling tired or having little energy 2   Poor

## 2024-01-15 NOTE — PROGRESS NOTES
Chief Complaint   Patient presents with    Fatigue     3-4 weeks, seen by NP last week and got labs done    Depression     6 week follow up    Medication Check     1. \"Have you been to the ER, urgent care clinic since your last visit?  Hospitalized since your last visit?\" No    2. \"Have you seen or consulted any other health care providers outside of the [facility] since your last visit?\" No     3. For patients aged 45-75: Has the patient had a colonoscopy / FIT/ Cologuard? NA - based on age      If the patient is female:    4. For patients aged 40-74: Has the patient had a mammogram within the past 2 years? Yes - no Care Gap present 9/2022       5. For patients aged 21-65: Has the patient had a pap smear? Dr Hensley @ [facility]

## 2024-02-05 ENCOUNTER — OFFICE VISIT (OUTPATIENT)
Age: 44
End: 2024-02-05
Payer: COMMERCIAL

## 2024-02-05 VITALS
BODY MASS INDEX: 28.67 KG/M2 | WEIGHT: 155.8 LBS | TEMPERATURE: 98.2 F | DIASTOLIC BLOOD PRESSURE: 68 MMHG | HEART RATE: 87 BPM | OXYGEN SATURATION: 97 % | HEIGHT: 62 IN | SYSTOLIC BLOOD PRESSURE: 112 MMHG | RESPIRATION RATE: 16 BRPM

## 2024-02-05 DIAGNOSIS — F32.A CHRONIC DEPRESSION: Primary | ICD-10-CM

## 2024-02-05 DIAGNOSIS — R53.82 CHRONIC FATIGUE: ICD-10-CM

## 2024-02-05 DIAGNOSIS — E55.9 VITAMIN D DEFICIENCY: ICD-10-CM

## 2024-02-05 DIAGNOSIS — F41.8 SITUATIONAL ANXIETY: ICD-10-CM

## 2024-02-05 DIAGNOSIS — F90.0 ADHD (ATTENTION DEFICIT HYPERACTIVITY DISORDER), INATTENTIVE TYPE: ICD-10-CM

## 2024-02-05 PROCEDURE — 99213 OFFICE O/P EST LOW 20 MIN: CPT | Performed by: FAMILY MEDICINE

## 2024-02-05 RX ORDER — BUPROPION HYDROCHLORIDE 150 MG/1
150 TABLET ORAL EVERY MORNING
Qty: 90 TABLET | Refills: 0 | Status: SHIPPED | OUTPATIENT
Start: 2024-02-05

## 2024-02-05 ASSESSMENT — ENCOUNTER SYMPTOMS
RESPIRATORY NEGATIVE: 1
GASTROINTESTINAL NEGATIVE: 1

## 2024-02-05 NOTE — PROGRESS NOTES
Chief Complaint   Patient presents with    3 Week Follow Up Depression, ADD & Medication Check     1. \"Have you been to the ER, urgent care clinic since your last visit?  Hospitalized since your last visit?\" No    2. \"Have you seen or consulted any other health care providers outside of the [facility] since your last visit?\" No     3. For patients aged 45-75: Has the patient had a colonoscopy / FIT/ Cologuard? NA - based on age      If the patient is female:    4. For patients aged 40-74: Has the patient had a mammogram within the past 2 years? Yes - no Care Gap present      5. For patients aged 21-65: Has the patient had a pap smear? Yes - no Care Gap present Dr Hensley @ [facility]

## 2024-02-06 NOTE — PROGRESS NOTES
Chief Complaint   Patient presents with    3 Week Follow Up Depression, ADD & Medication Check     HISTORY OF PRESENT ILLNESS   HPI  Patient presents for 3 week follow up and medication check.  She has history of depression, anxiety, chronic fatigue, sleep disorder and more recent diagnosis of ADHD inattentive type diagnosed via formal testing by her counselor in 9-2023.  We had initially tried her on a course of Prozac and titrated that from 10 to 20 mg back in . However shortly thereafter she noticed worsening of her chronic fatigue symptoms.  Her lab evaluation during that time was unremarkable except low Vitamin D.  At her last visit 1-15-24 we stopped the Prozac, started her on trial of Wellbutrin  mg and got her started on supplementation of Vitamin D3.   Since making these changes she has gradually been feeling better and better.  The past week she has noticed even more of an improvement in her overall symptoms by ~ 50% or so.  She is starting to get her energy back and is definitely able to get more things done now.  She has been more motivated.  Not staying in bed sleeping all day.  Getting up on a regular routine every morning, getting a lot more done and her days have been much more productive.  Her concentration and focus have improved.   Mood has picked up.  Feeling more encouraged.  Not feeling anxious, nervous, jittery.   Her sleep patterns are \"back to her baseline\" which has never been really really good but she is at least happy she is not feeling malaise and not wanting to sleep all day anymore.  She is tolerating the Wellbutrin w/o reaction or side effects otherwise.   Pleased w/ her progress thus far and wants to stay the course.      REVIEW OF SYMPTOMS   Review of Systems   Constitutional: Negative.    HENT: Negative.     Respiratory: Negative.     Cardiovascular: Negative.    Gastrointestinal: Negative.    Neurological: Negative.              PROBLEM LIST/MEDICAL HISTORY

## 2024-02-22 ENCOUNTER — PATIENT MESSAGE (OUTPATIENT)
Age: 44
End: 2024-02-22

## 2024-02-22 RX ORDER — FLUCONAZOLE 150 MG/1
150 TABLET ORAL ONCE
Qty: 1 TABLET | Refills: 0 | Status: SHIPPED | OUTPATIENT
Start: 2024-02-22 | End: 2024-02-22

## 2024-02-22 RX ORDER — NITROFURANTOIN 25; 75 MG/1; MG/1
100 CAPSULE ORAL 2 TIMES DAILY
Qty: 10 CAPSULE | Refills: 0 | Status: SHIPPED | OUTPATIENT
Start: 2024-02-22 | End: 2024-02-27

## 2024-02-22 NOTE — TELEPHONE ENCOUNTER
From: Tessy Jansen  To: Dr. Hilda Curtis  Sent: 2/22/2024 6:38 AM EST  Subject: UTI!    Greetings Dr. Curtis. I figured I would try my hand at messaging you instead of trying schedule an appt or going to [facility]. Not surprisingly, I’m pretty sure I have a UTI again. I’ve been going to the bathroom constantly, at times feeling the pressure and urge but nothing will come out. I’ve also had a lot of lower belly pain and pressure. I’ve had enough of these to know what’s going on but is it possible to get a prescription for it or do I need to be seen?    Also, can frequent constipation cause UTIs? I’m just trying to figure out why I get them so often.     Thank you in advance for any help.     Tessy

## 2024-05-12 ENCOUNTER — TELEPHONE (OUTPATIENT)
Age: 44
End: 2024-05-12

## 2024-05-12 DIAGNOSIS — R53.82 CHRONIC FATIGUE: ICD-10-CM

## 2024-05-12 DIAGNOSIS — F90.0 ADHD (ATTENTION DEFICIT HYPERACTIVITY DISORDER), INATTENTIVE TYPE: ICD-10-CM

## 2024-05-12 DIAGNOSIS — F32.A CHRONIC DEPRESSION: ICD-10-CM

## 2024-05-14 RX ORDER — BUPROPION HYDROCHLORIDE 150 MG/1
150 TABLET ORAL EVERY MORNING
Qty: 90 TABLET | Refills: 0 | Status: SHIPPED | OUTPATIENT
Start: 2024-05-14 | End: 2024-07-01

## 2024-05-14 NOTE — TELEPHONE ENCOUNTER
Last visit 2/5/24  
Left detailed message of need to schedule a f/u appt this month.  Advised that I would send a [employer] message as well.    Spoke to pt to let her know that Dr Luna wanted to see her for a f/u med check.  I scheduled her for a virtual visit for 5/31 @ 9:00.  
Sent in rx renewal.  Patient due 3 month follow up this month.  Nothing scheduled.  Please get booked asap.  Thanks.   
Female

## 2024-05-22 ENCOUNTER — HOSPITAL ENCOUNTER (EMERGENCY)
Facility: HOSPITAL | Age: 44
Discharge: HOME | End: 2024-05-22
Attending: STUDENT IN AN ORGANIZED HEALTH CARE EDUCATION/TRAINING PROGRAM
Payer: COMMERCIAL

## 2024-05-22 VITALS
BODY MASS INDEX: 29.62 KG/M2 | WEIGHT: 160.94 LBS | HEIGHT: 62 IN | DIASTOLIC BLOOD PRESSURE: 88 MMHG | SYSTOLIC BLOOD PRESSURE: 119 MMHG | TEMPERATURE: 97.4 F | OXYGEN SATURATION: 98 % | HEART RATE: 90 BPM | RESPIRATION RATE: 18 BRPM

## 2024-05-22 DIAGNOSIS — K56.41 FECAL IMPACTION (HCC): Primary | ICD-10-CM

## 2024-05-22 DIAGNOSIS — K59.00 CONSTIPATION, UNSPECIFIED CONSTIPATION TYPE: ICD-10-CM

## 2024-05-22 PROCEDURE — 99282 EMERGENCY DEPT VISIT SF MDM: CPT

## 2024-05-22 ASSESSMENT — ENCOUNTER SYMPTOMS
SHORTNESS OF BREATH: 0
VOMITING: 0
ABDOMINAL PAIN: 0
COUGH: 0
SORE THROAT: 0
DIARRHEA: 0
NAUSEA: 0
CONSTIPATION: 1
EYE PAIN: 0

## 2024-05-22 ASSESSMENT — PAIN DESCRIPTION - LOCATION: LOCATION: RECTUM

## 2024-05-22 ASSESSMENT — PAIN SCALES - GENERAL: PAINLEVEL_OUTOF10: 5

## 2024-05-22 ASSESSMENT — PAIN DESCRIPTION - DESCRIPTORS: DESCRIPTORS: ACHING;THROBBING

## 2024-05-22 NOTE — ED NOTES
Patient stable at time of discharge. Reviewed discharge instructions, follow up, and home care with patient. Allowed time for questions. Patient verbalized understanding. Ambulatory out of department with steady gait unaccompanied.

## 2024-05-22 NOTE — ED TRIAGE NOTES
ED triage note: ambulatory with a steady gait. Patient reports today has had lower abdominal pain tried to have a bowel movement and felt large amount of stool get stuck in rectum. Patient reports history of constipation, IBS, and gastroparesis. Reports took oral dulcolax 2 hours ago.

## 2024-05-22 NOTE — DISCHARGE INSTRUCTIONS
Continue to monitor symptoms at home. Get magnesium citrate OTC and drink entire bottle in one sitting. Can also take 1 capful of Miralax three times per day. You can typically expect a bowel movement in 1-3 days. Stay hydrated and eat plenty of fiber including raw fruits, vegetables and whole grains. Return with any changes or worsening. Follow up with PCP and GI.

## 2024-05-22 NOTE — ED PROVIDER NOTES
[facility]  EMERGENCY DEPARTMENT ENCOUNTER      Pt Name: Tessy Jansen  MRN: [id number]  Birthdate 1980  Date of evaluation: 5/22/2024  Provider: NEVILLE HUNTER    CHIEF COMPLAINT       Chief Complaint   Patient presents with    Abdominal Pain    Constipation         HISTORY OF PRESENT ILLNESS   (Location/Symptom, Timing/Onset, Context/Setting, Quality, Duration, Modifying Factors, Severity)  Note limiting factors.   44-year-old female presents to ED with rectal pain and constipation.  Patient reports that she has a history of constipation with IBS and gastroparesis.  She reports she takes fiber supplements daily but recently has felt especially constipated.  She reports she attempted to have a bowel movement prior to arrival today and was unable to do so.  She put gloves on and tried to disimpact herself and reports that she felt a large stool ball in her colon but was not able to have a bowel movement still.  She took a Dulcolax about 1 to 2 hours prior to arrival but has not felt any relief.  She became concerned and decided to come in.  Denies any associated abdominal pain, fevers, chills, nausea, vomiting.            Review of External Medical Records:     Nursing Notes were reviewed.    REVIEW OF SYSTEMS    (2-9 systems for level 4, 10 or more for level 5)     Review of Systems   Constitutional:  Negative for chills and fever.   HENT:  Negative for congestion, ear pain and sore throat.    Eyes:  Negative for pain.   Respiratory:  Negative for cough and shortness of breath.    Cardiovascular:  Negative for chest pain.   Gastrointestinal:  Positive for constipation. Negative for abdominal pain, diarrhea, nausea and vomiting.   Genitourinary:  Negative for dysuria and flank pain.   Musculoskeletal:  Negative for myalgias.   Skin:  Negative for rash.   Neurological:  Negative for dizziness and headaches.   Hematological:  Negative for adenopathy.       Except as noted above the remainder of the

## 2024-05-31 ENCOUNTER — TELEMEDICINE (OUTPATIENT)
Age: 44
End: 2024-05-31
Payer: COMMERCIAL

## 2024-05-31 DIAGNOSIS — R45.86 MOOD SWINGS: ICD-10-CM

## 2024-05-31 DIAGNOSIS — R53.82 CHRONIC FATIGUE: ICD-10-CM

## 2024-05-31 DIAGNOSIS — F90.0 ADHD (ATTENTION DEFICIT HYPERACTIVITY DISORDER), INATTENTIVE TYPE: ICD-10-CM

## 2024-05-31 DIAGNOSIS — K58.1 IRRITABLE BOWEL SYNDROME WITH CONSTIPATION: ICD-10-CM

## 2024-05-31 DIAGNOSIS — F32.A CHRONIC DEPRESSION: Primary | ICD-10-CM

## 2024-05-31 PROCEDURE — 99214 OFFICE O/P EST MOD 30 MIN: CPT | Performed by: FAMILY MEDICINE

## 2024-05-31 ASSESSMENT — ENCOUNTER SYMPTOMS
ABDOMINAL PAIN: 0
VOMITING: 0
NAUSEA: 0
RESPIRATORY NEGATIVE: 1

## 2024-05-31 NOTE — PROGRESS NOTES
Can definitely feel a difference in a good way/ feels that things are moving in a positive directionSeeing counselor about once q 3 weeks which continues to be very helpful and beneficial    Falls asleep fine, some nights still has frequent awakenings  But doing journalining, meditation, reading relaxing material    Varies, some days better than others, not completely there where she wants to be but better  65% improved   restarted walking regularly again since ~ 4-2024, goes out for a walk 3-5 x a week x 1-3 miles usually ~30- 45 minutes   Had not been consistent w/ her Fiber and had been off Miralax for over a month  Got very constipated, ended up in ER  Has since restarted taking Fiber and takes it consistently every morning and is back on the Miralax and taking it every evening and ever since then she is very regular and doing much better  She has h/o IBS-C and gastroparesis and is scheduled to follow up w/ her GI Dr. Dora Herrera as well on 7/2    Got a promotion, very excited about it  But her work load will increase  Still having some issues w/ getting motivated to get everything done for work    But feels like she is in a much better place overall compared to where she was a few months ago    Frustrated w/ her lack of motivation to get things done  Still some mood swings, emotional highs and lows  Better but not where she wants to be  
appropriately licensed, registered, or certified to deliver care in the state where the patient is located as indicated above. If you are not or unsure, please re-schedule the visit: Yes, I confirm.       --Hilda Curtis MD on 5/31/2024 at 9:47 AM    An electronic signature was used to authenticate this note.

## 2024-06-27 ENCOUNTER — OFFICE VISIT (OUTPATIENT)
Age: 44
End: 2024-06-27
Payer: COMMERCIAL

## 2024-06-27 VITALS
HEIGHT: 62 IN | BODY MASS INDEX: 28.52 KG/M2 | WEIGHT: 155 LBS | HEART RATE: 103 BPM | DIASTOLIC BLOOD PRESSURE: 68 MMHG | OXYGEN SATURATION: 99 % | SYSTOLIC BLOOD PRESSURE: 118 MMHG | RESPIRATION RATE: 16 BRPM

## 2024-06-27 DIAGNOSIS — M48.02 NEUROFORAMINAL STENOSIS OF CERVICAL SPINE: Primary | ICD-10-CM

## 2024-06-27 DIAGNOSIS — Z86.69 H/O MIGRAINE: ICD-10-CM

## 2024-06-27 DIAGNOSIS — M54.2 CERVICALGIA: ICD-10-CM

## 2024-06-27 DIAGNOSIS — R20.2 PARESTHESIA OF LEFT ARM: ICD-10-CM

## 2024-06-27 PROCEDURE — 99204 OFFICE O/P NEW MOD 45 MIN: CPT | Performed by: PSYCHIATRY & NEUROLOGY

## 2024-06-27 ASSESSMENT — PATIENT HEALTH QUESTIONNAIRE - PHQ9
SUM OF ALL RESPONSES TO PHQ QUESTIONS 1-9: 0
SUM OF ALL RESPONSES TO PHQ QUESTIONS 1-9: 0
2. FEELING DOWN, DEPRESSED OR HOPELESS: NOT AT ALL
SUM OF ALL RESPONSES TO PHQ9 QUESTIONS 1 & 2: 0
SUM OF ALL RESPONSES TO PHQ QUESTIONS 1-9: 0
SUM OF ALL RESPONSES TO PHQ QUESTIONS 1-9: 0
1. LITTLE INTEREST OR PLEASURE IN DOING THINGS: NOT AT ALL

## 2024-06-27 NOTE — PROGRESS NOTES
NEUROLOGY  NEW PATIENT EVALUATION/CONSULTATION       PATIENT NAME: Tessy Jansen    MRN: [id number]    REASON FOR CONSULTATION: Neck pain    06/27/24      Previous records (physician notes, laboratory reports, and radiology reports) and imaging studies were reviewed and summarized. My recommendations will be communicated back to the patient's physician(s) via electronic medical record and/or by US mail.       HISTORY OF PRESENT ILLNESS:  Tessy Jansen is a 44 y.o.  female presenting for evaluation of chronic neck pain and radicular pain/paresthesias involving the LUE x 20 years. No preceding trauma. Neck/shoulder pain is localized to the left side and is rather constant. She experiences intermittent heaviness and tingling down the LUE into all fingers every other month on average. Denies paresthesias, weakness involving other extremities. Gait remains stable. She does suffer from migraines and follows with Dr. Pratt for these headaches. She is off of migraine preventative medications at this time as she felt she is taking too many medications. She has not noticed a correlation between her LUE paresthesias and headaches. Headaches are currently once weekly, more mild, with intermittent nausea, photophobia when having more severe headaches/migraines. PT has been transiently helpful for cervicalgia in the past. EMG 3/7/24 LUE was reviewed ([facility]) and normal. MRI Cervical 5/2023 was reviewed c/w C5-6, C6-7 disc extrusions, mild R NFS C5-6, mild C7-T1 L NFS, no significant spinal canal stenosis. Seen by Orthopedic surgery with recommendation for conservative management. She has tried muscle relaxants in the past with some relief.     PAST MEDICAL HISTORY:  Past Medical History:   Diagnosis Date    ADHD (attention deficit hyperactivity disorder), inattentive type 12/08/2023    Formal testing by her counselor/therapist 9-2023    Adjustment reaction with anxiety and depression 04/14/2023    Restarted counseling

## 2024-06-28 DIAGNOSIS — F40.240 CLAUSTROPHOBIA: Primary | ICD-10-CM

## 2024-06-28 NOTE — TELEPHONE ENCOUNTER
Patient would like a call to let her know if she can have medication sent in for her claustrophobia so that she is able to have her MRI done on 7/19.    Patient would like for it to be sent to the [facility] on file.

## 2024-07-01 ENCOUNTER — TELEPHONE (OUTPATIENT)
Age: 44
End: 2024-07-01

## 2024-07-01 ENCOUNTER — TELEMEDICINE (OUTPATIENT)
Age: 44
End: 2024-07-01
Payer: COMMERCIAL

## 2024-07-01 DIAGNOSIS — R45.86 MOOD SWINGS: ICD-10-CM

## 2024-07-01 DIAGNOSIS — F41.9 ANXIETY AND DEPRESSION: Primary | ICD-10-CM

## 2024-07-01 DIAGNOSIS — F90.0 ADHD (ATTENTION DEFICIT HYPERACTIVITY DISORDER), INATTENTIVE TYPE: ICD-10-CM

## 2024-07-01 DIAGNOSIS — F32.A ANXIETY AND DEPRESSION: Primary | ICD-10-CM

## 2024-07-01 DIAGNOSIS — R53.82 CHRONIC FATIGUE: ICD-10-CM

## 2024-07-01 PROCEDURE — 99213 OFFICE O/P EST LOW 20 MIN: CPT | Performed by: FAMILY MEDICINE

## 2024-07-01 RX ORDER — LORAZEPAM 0.5 MG/1
TABLET ORAL
Qty: 2 TABLET | Refills: 0 | Status: SHIPPED | OUTPATIENT
Start: 2024-07-01 | End: 2024-07-20

## 2024-07-01 RX ORDER — BUPROPION HYDROCHLORIDE 300 MG/1
300 TABLET ORAL EVERY MORNING
Qty: 90 TABLET | Refills: 1 | Status: SHIPPED | OUTPATIENT
Start: 2024-07-01

## 2024-07-01 ASSESSMENT — ENCOUNTER SYMPTOMS
RESPIRATORY NEGATIVE: 1
GASTROINTESTINAL NEGATIVE: 1

## 2024-07-01 NOTE — PROGRESS NOTES
buPROPion (WELLBUTRIN XL) 300 MG extended release tablet; Take 1 tablet by mouth every morning, Disp-90 tablet, R-1Normal  4. Chronic fatigue  -     buPROPion (WELLBUTRIN XL) 300 MG extended release tablet; Take 1 tablet by mouth every morning, Disp-90 tablet, R-1Normal    Overall improvement since increasing dose of Wellbutrin XL from 150 mg to 300 mg daily. Submitted new rx for the updated dose to her local pharmacy on file. RTC later this fall for CPE and med check. Call back or follow up sooner in the interim prn        Patient was evaluated through a synchronous (real-time) audio-video encounter. The patient (or guardian if applicable) is aware that this is a billable service. Verbal consent to proceed has been obtained within the past 12 months. The visit was conducted pursuant to the emergency declaration under the [place] Act and the National Emergencies Act, 1135 waiver authority and the Coronavirus Preparedness and Response Supplemental Appropriations Act.  Patient identification was verified, and a caregiver was present when appropriate. The patient was located in a state where the provider was credentialed to provide care.       Tessy Jansen, was evaluated through a synchronous (real-time) audio-video encounter. The patient (or guardian if applicable) is aware that this is a billable service, which includes applicable co-pays. This Virtual Visit was conducted with patient's (and/or legal guardian's) consent. Patient identification was verified, and a caregiver was present when appropriate.   The patient was located at Home: [address] [place] [id number]  Provider was located at Facility (Appt Dept): [address] [place] [id number]  Confirm you are appropriately licensed, registered, or certified to deliver care in the state where the patient is located as indicated above. If you are not or unsure, please re-schedule the visit: Yes, I confirm.       --Hilda Curtis MD

## 2024-07-01 NOTE — TELEPHONE ENCOUNTER
Patient called stated that she has a UTI and provider told her to call when she have this problem she will call something in.    Requesting a call back    Best call back #[phone]

## 2024-07-19 ENCOUNTER — HOSPITAL ENCOUNTER (OUTPATIENT)
Facility: HOSPITAL | Age: 44
Discharge: HOME | End: 2024-07-19
Attending: PSYCHIATRY & NEUROLOGY
Payer: COMMERCIAL

## 2024-07-19 DIAGNOSIS — Z86.69 H/O MIGRAINE: ICD-10-CM

## 2024-07-19 DIAGNOSIS — R20.2 PARESTHESIA OF LEFT ARM: ICD-10-CM

## 2024-07-19 PROCEDURE — 70551 MRI BRAIN STEM W/O DYE: CPT

## 2024-10-19 DIAGNOSIS — F32.A ANXIETY AND DEPRESSION: ICD-10-CM

## 2024-10-19 DIAGNOSIS — R45.86 MOOD SWINGS: ICD-10-CM

## 2024-10-19 DIAGNOSIS — R53.82 CHRONIC FATIGUE: ICD-10-CM

## 2024-10-19 DIAGNOSIS — F90.0 ADHD (ATTENTION DEFICIT HYPERACTIVITY DISORDER), INATTENTIVE TYPE: ICD-10-CM

## 2024-10-19 DIAGNOSIS — F41.9 ANXIETY AND DEPRESSION: ICD-10-CM

## 2024-10-21 RX ORDER — BUPROPION HYDROCHLORIDE 300 MG/1
300 TABLET ORAL EVERY MORNING
Qty: 90 TABLET | Refills: 1 | OUTPATIENT
Start: 2024-10-21

## 2024-10-21 NOTE — TELEPHONE ENCOUNTER
MD SANCHEZ,    Contacted [facility], patient just filled/picked up 90 d/s on 10/19/24.  (Rx 7/1/24 90 + 1)     Rx requested to soon- Refused.  Thanks, Robyn

## 2024-11-07 ENCOUNTER — OFFICE VISIT (OUTPATIENT)
Age: 44
End: 2024-11-07
Payer: COMMERCIAL

## 2024-11-07 VITALS
DIASTOLIC BLOOD PRESSURE: 70 MMHG | SYSTOLIC BLOOD PRESSURE: 110 MMHG | TEMPERATURE: 97.7 F | BODY MASS INDEX: 27.42 KG/M2 | RESPIRATION RATE: 18 BRPM | WEIGHT: 149 LBS | OXYGEN SATURATION: 98 % | HEIGHT: 62 IN | HEART RATE: 97 BPM

## 2024-11-07 DIAGNOSIS — R53.82 CHRONIC FATIGUE: ICD-10-CM

## 2024-11-07 DIAGNOSIS — F41.9 ANXIETY AND DEPRESSION: Primary | ICD-10-CM

## 2024-11-07 DIAGNOSIS — F90.0 ADHD (ATTENTION DEFICIT HYPERACTIVITY DISORDER), INATTENTIVE TYPE: ICD-10-CM

## 2024-11-07 DIAGNOSIS — F32.A ANXIETY AND DEPRESSION: Primary | ICD-10-CM

## 2024-11-07 PROCEDURE — 99213 OFFICE O/P EST LOW 20 MIN: CPT | Performed by: FAMILY MEDICINE

## 2024-11-07 SDOH — ECONOMIC STABILITY: INCOME INSECURITY: HOW HARD IS IT FOR YOU TO PAY FOR THE VERY BASICS LIKE FOOD, HOUSING, MEDICAL CARE, AND HEATING?: SOMEWHAT HARD

## 2024-11-07 SDOH — ECONOMIC STABILITY: FOOD INSECURITY: WITHIN THE PAST 12 MONTHS, YOU WORRIED THAT YOUR FOOD WOULD RUN OUT BEFORE YOU GOT MONEY TO BUY MORE.: NEVER TRUE

## 2024-11-07 SDOH — ECONOMIC STABILITY: FOOD INSECURITY: WITHIN THE PAST 12 MONTHS, THE FOOD YOU BOUGHT JUST DIDN'T LAST AND YOU DIDN'T HAVE MONEY TO GET MORE.: NEVER TRUE

## 2024-11-07 SDOH — ECONOMIC STABILITY: TRANSPORTATION INSECURITY
IN THE PAST 12 MONTHS, HAS LACK OF TRANSPORTATION KEPT YOU FROM MEETINGS, WORK, OR FROM GETTING THINGS NEEDED FOR DAILY LIVING?: NO

## 2024-11-07 ASSESSMENT — ENCOUNTER SYMPTOMS
GASTROINTESTINAL NEGATIVE: 1
RESPIRATORY NEGATIVE: 1

## 2024-11-07 NOTE — PROGRESS NOTES
Chief Complaint   Patient presents with    Anxiety     Medication /      \"Have you been to the ER, urgent care clinic since your last visit?  Hospitalized since your last visit?\"    YES - When: approximately 2 months ago.  Where and [facility] .R/O UTI    “Have you seen or consulted any other health care providers outside of [facility] since your last visit?”    NO                   6/27/2024    12:59 PM   PHQ-9    Little interest or pleasure in doing things 0   Feeling down, depressed, or hopeless 0   PHQ-2 Score 0   PHQ-9 Total Score 0           Financial Resource Strain: Medium Risk (11/7/2024)    Overall Financial Resource Strain (CARDIA)     Difficulty of Paying Living Expenses: Somewhat hard      Food Insecurity: No Food Insecurity (11/7/2024)    Hunger Vital Sign     Worried About Running Out of Food in the Last Year: Never true     Ran Out of Food in the Last Year: Never true          Health Maintenance Due   Topic Date Due    Hepatitis C screen  Never done    Lipids  Never done    Flu vaccine (1) 08/01/2024    COVID-19 Vaccine (4 - 2023-24 season) 09/01/2024        
3/2014, Chiro 9/2015 x months        AR (allergic rhinitis) 06/28/2010     Overview Note:     [facility] Allergy eval 5-2016        Uterine prolapse 06/28/2010           PAST SURGICAL HISTORY     Past Surgical History:   Procedure Laterality Date    BLADDER SUSPENSION  03/23/2015    Prolapse    GYN  2003    Cryotherapy for abnormal pap    PARTIAL HYSTERECTOMY (CERVIX NOT REMOVED)  03/23/2015    Prolapse; cervix and ovaries intact         MEDICATIONS     Current Outpatient Medications   Medication Sig    buPROPion (WELLBUTRIN XL) 300 MG extended release tablet Take 1 tablet by mouth every morning    FIBER ADULT GUMMIES PO Take by mouth every morning    Polyethylene Glycol 3350 (MIRALAX PO) Take by mouth every evening    vitamin D (CHOLECALCIFEROL) 25 MCG (1000 UT) TABS tablet Take 1 tablet by mouth daily    butalbital-APAP-caffeine -40 MG CAPS per capsule Take 1 capsule by mouth every 6 hours as needed    fexofenadine (ALLEGRA) 180 MG tablet Take 1 tablet by mouth daily     No current facility-administered medications for this visit.          ALLERGIES     Allergies   Allergen Reactions    Sulfa Antibiotics Swelling     Swollen tongue    Sulfamethoxazole-Trimethoprim Shortness Of Breath    Sumatriptan Other (See Comments) and Rash     Heart palpitations, felt like throat was closing, worsening head pain, light headed and spacy    Other Reaction(s): Other (see comments)    Codeine Rash    Prozac [Fluoxetine] Other (See Comments)     Fatigue, no energy          SOCIAL HISTORY     Social History     Tobacco Use    Smoking status: Never     Passive exposure: Past    Smokeless tobacco: Never   Substance Use Topics    Alcohol use: No     Social History     Social History Narrative     since 2021 (they were  for 17 yrs)      Ex- lives in [place] and they co-parent which is going well and they maintain a good relationship for their kids      She lived in [place] w/ her 2 kids but moved to

## 2025-01-19 DIAGNOSIS — F90.0 ADHD (ATTENTION DEFICIT HYPERACTIVITY DISORDER), INATTENTIVE TYPE: ICD-10-CM

## 2025-01-19 DIAGNOSIS — R53.82 CHRONIC FATIGUE: ICD-10-CM

## 2025-01-19 DIAGNOSIS — R45.86 MOOD SWINGS: ICD-10-CM

## 2025-01-19 DIAGNOSIS — F32.A ANXIETY AND DEPRESSION: ICD-10-CM

## 2025-01-19 DIAGNOSIS — F41.9 ANXIETY AND DEPRESSION: ICD-10-CM

## 2025-01-20 RX ORDER — BUPROPION HYDROCHLORIDE 300 MG/1
300 TABLET ORAL EVERY MORNING
Qty: 90 TABLET | Refills: 1 | Status: SHIPPED | OUTPATIENT
Start: 2025-01-20

## 2025-01-20 NOTE — TELEPHONE ENCOUNTER
PCP: No primary care provider on file.    Last appt: 11/7/2024     Future Appointments   Date Time Provider Department Center   1/28/2025  8:40 AM Hilda Curtis [facility]       Requested Prescriptions     Pending Prescriptions Disp Refills    buPROPion (WELLBUTRIN XL) 300 MG extended release tablet 90 tablet 1     Sig: Take 1 tablet by mouth every morning       Prior labs and Blood pressures:  BP Readings from Last 3 Encounters:   11/07/24 110/70   06/27/24 118/68   05/22/24 119/88     Lab Results   Component Value Date/Time     01/09/2024 02:13 PM    K 3.9 01/09/2024 02:13 PM     01/09/2024 02:13 PM    CO2 28 01/09/2024 02:13 PM    BUN 14 01/09/2024 02:13 PM    GFRAA >60 09/07/2022 10:36 AM     No results found for: \"HBA1C\", \"OMQ4ILYG\"  No results found for: \"CHOL\", \"CHOLPOCT\", \"CHLST\", \"CHOLV\", \"HDL\", \"HDLPOC\", \"HDLC\", \"LDL\", \"LDLC\", \"VLDLC\", \"VLDL\"  No results found for: \"VITD3\"    Lab Results   Component Value Date/Time    TSH 1.14 01/09/2024 02:13 PM

## 2025-01-28 ENCOUNTER — OFFICE VISIT (OUTPATIENT)
Age: 45
End: 2025-01-28
Payer: COMMERCIAL

## 2025-01-28 VITALS
DIASTOLIC BLOOD PRESSURE: 72 MMHG | TEMPERATURE: 97.1 F | SYSTOLIC BLOOD PRESSURE: 102 MMHG | OXYGEN SATURATION: 100 % | WEIGHT: 149 LBS | HEART RATE: 94 BPM | RESPIRATION RATE: 18 BRPM | BODY MASS INDEX: 27.42 KG/M2 | HEIGHT: 62 IN

## 2025-01-28 DIAGNOSIS — Z12.11 SCREENING FOR COLON CANCER: ICD-10-CM

## 2025-01-28 DIAGNOSIS — F41.9 ANXIETY AND DEPRESSION: ICD-10-CM

## 2025-01-28 DIAGNOSIS — F32.A ANXIETY AND DEPRESSION: ICD-10-CM

## 2025-01-28 DIAGNOSIS — E55.9 VITAMIN D DEFICIENCY: ICD-10-CM

## 2025-01-28 DIAGNOSIS — Z11.59 NEED FOR HEPATITIS C SCREENING TEST: ICD-10-CM

## 2025-01-28 DIAGNOSIS — Z00.00 ANNUAL PHYSICAL EXAM: Primary | ICD-10-CM

## 2025-01-28 PROCEDURE — 99396 PREV VISIT EST AGE 40-64: CPT | Performed by: FAMILY MEDICINE

## 2025-01-28 RX ORDER — BUSPIRONE HYDROCHLORIDE 10 MG/1
10 TABLET ORAL 2 TIMES DAILY
COMMUNITY
Start: 2025-01-08

## 2025-01-28 SDOH — ECONOMIC STABILITY: FOOD INSECURITY: WITHIN THE PAST 12 MONTHS, THE FOOD YOU BOUGHT JUST DIDN'T LAST AND YOU DIDN'T HAVE MONEY TO GET MORE.: NEVER TRUE

## 2025-01-28 SDOH — ECONOMIC STABILITY: FOOD INSECURITY: WITHIN THE PAST 12 MONTHS, YOU WORRIED THAT YOUR FOOD WOULD RUN OUT BEFORE YOU GOT MONEY TO BUY MORE.: NEVER TRUE

## 2025-01-28 ASSESSMENT — PATIENT HEALTH QUESTIONNAIRE - PHQ9
SUM OF ALL RESPONSES TO PHQ QUESTIONS 1-9: 10
4. FEELING TIRED OR HAVING LITTLE ENERGY: NEARLY EVERY DAY
1. LITTLE INTEREST OR PLEASURE IN DOING THINGS: NOT AT ALL
SUM OF ALL RESPONSES TO PHQ QUESTIONS 1-9: 10
10. IF YOU CHECKED OFF ANY PROBLEMS, HOW DIFFICULT HAVE THESE PROBLEMS MADE IT FOR YOU TO DO YOUR WORK, TAKE CARE OF THINGS AT HOME, OR GET ALONG WITH OTHER PEOPLE: SOMEWHAT DIFFICULT
7. TROUBLE CONCENTRATING ON THINGS, SUCH AS READING THE NEWSPAPER OR WATCHING TELEVISION: NEARLY EVERY DAY
8. MOVING OR SPEAKING SO SLOWLY THAT OTHER PEOPLE COULD HAVE NOTICED. OR THE OPPOSITE, BEING SO FIGETY OR RESTLESS THAT YOU HAVE BEEN MOVING AROUND A LOT MORE THAN USUAL: NOT AT ALL
SUM OF ALL RESPONSES TO PHQ QUESTIONS 1-9: 10
6. FEELING BAD ABOUT YOURSELF - OR THAT YOU ARE A FAILURE OR HAVE LET YOURSELF OR YOUR FAMILY DOWN: NOT AT ALL
9. THOUGHTS THAT YOU WOULD BE BETTER OFF DEAD, OR OF HURTING YOURSELF: NOT AT ALL
3. TROUBLE FALLING OR STAYING ASLEEP: NEARLY EVERY DAY
SUM OF ALL RESPONSES TO PHQ9 QUESTIONS 1 & 2: 0
5. POOR APPETITE OR OVEREATING: SEVERAL DAYS
2. FEELING DOWN, DEPRESSED OR HOPELESS: NOT AT ALL
SUM OF ALL RESPONSES TO PHQ QUESTIONS 1-9: 10

## 2025-01-28 ASSESSMENT — ENCOUNTER SYMPTOMS
RESPIRATORY NEGATIVE: 1
EYES NEGATIVE: 1
GASTROINTESTINAL NEGATIVE: 1

## 2025-01-28 NOTE — PROGRESS NOTES
Chief Complaint   Patient presents with    Annual Exam     \"Have you been to the ER, urgent care clinic since your last visit?  Hospitalized since your last visit?\"    NO    “Have you seen or consulted any other health care providers outside of [facility] since your last visit?”    NO                   1/28/2025     8:46 AM   PHQ-9    Little interest or pleasure in doing things 0   Feeling down, depressed, or hopeless 0   Trouble falling or staying asleep, or sleeping too much 3   Feeling tired or having little energy 3   Poor appetite or overeating 1   Feeling bad about yourself - or that you are a failure or have let yourself or your family down 0   Trouble concentrating on things, such as reading the newspaper or watching television 3   Moving or speaking so slowly that other people could have noticed. Or the opposite - being so fidgety or restless that you have been moving around a lot more than usual 0   Thoughts that you would be better off dead, or of hurting yourself in some way 0   PHQ-2 Score 0   PHQ-9 Total Score 10   If you checked off any problems, how difficult have these problems made it for you to do your work, take care of things at home, or get along with other people? 1           Financial Resource Strain: Medium Risk (11/7/2024)    Overall Financial Resource Strain (CARDIA)     Difficulty of Paying Living Expenses: Somewhat hard      Food Insecurity: No Food Insecurity (1/28/2025)    Hunger Vital Sign     Worried About Running Out of Food in the Last Year: Never true     Ran Out of Food in the Last Year: Never true          Health Maintenance Due   Topic Date Due    Hepatitis C screen  Never done    Hepatitis B vaccine (1 of 3 - 19+ 3-dose series) Never done    Lipids  Never done    Flu vaccine (1) 08/01/2024    COVID-19 Vaccine (4 - 2023-24 season) 09/01/2024       
tenderness.   Musculoskeletal:         General: No swelling or tenderness.      Cervical back: Neck supple.      Right lower leg: No edema.      Left lower leg: No edema.   Lymphadenopathy:      Cervical: No cervical adenopathy.   Skin:     General: Skin is warm and dry.   Neurological:      General: No focal deficit present.      Mental Status: She is oriented to person, place, and time. Mental status is at baseline.   Psychiatric:         Mood and Affect: Mood normal.            ASSESSMENT & PLAN     1. Annual physical exam  -     CBC; Future  -     Comprehensive Metabolic Panel; Future  -     Lipid Panel; Future  -     TSH; Future  -     Hepatitis C Antibody; Future  -     Vitamin D 25 Hydroxy; Future  2. Need for hepatitis C screening test  -     Hepatitis C Antibody; Future  3. Vitamin D deficiency  -     Vitamin D 25 Hydroxy; Future  4. Screening for colon cancer  -     Referral: Sandeep Head MD, Gastroenterology, [place]  5. Anxiety and depression  Monitored and managed by specialists. Continue under psychiatrist's and therapist's care at this time.    She will have the above labs done fasting in the next week at one of our walk-in lab draw stations near her home or job.    Reviewed diet, nutrition, exercise, weight management/goals, risk reduction, cardiovascular goals for age and associated health risks.    Reviewed medications, effects, risks, benefits, precautions, potential interactions and possible side effects.     Age/risk based screening recommendations, health maintenance & prevention counseling. Cancer screening USPTFS guidelines reviewed w/ pt today. Discussed benefits/positive/negative outcomes of screening based on age/risk stratification. Informed consent for/against screening based on pt's personal hx/risk factors. Updated in history above, health maintenance section of chart and nurse's note.    Sees her GYN annually for well woman visits, GYN exams, Pap screenings per guidelines.

## 2025-01-30 DIAGNOSIS — E55.9 VITAMIN D DEFICIENCY: ICD-10-CM

## 2025-01-30 DIAGNOSIS — Z11.59 NEED FOR HEPATITIS C SCREENING TEST: ICD-10-CM

## 2025-01-30 DIAGNOSIS — Z00.00 ANNUAL PHYSICAL EXAM: ICD-10-CM

## 2025-01-30 LAB
25(OH)D3 SERPL-MCNC: 28.5 NG/ML (ref 30–100)
ALBUMIN SERPL-MCNC: 3.9 G/DL (ref 3.5–5)
ALBUMIN/GLOB SERPL: 1.3 (ref 1.1–2.2)
ALP SERPL-CCNC: 64 U/L (ref 45–117)
ALT SERPL-CCNC: 18 U/L (ref 12–78)
ANION GAP SERPL CALC-SCNC: 3 MMOL/L (ref 2–12)
AST SERPL-CCNC: 9 U/L (ref 15–37)
BILIRUB SERPL-MCNC: 0.4 MG/DL (ref 0.2–1)
BUN SERPL-MCNC: 14 MG/DL (ref 6–20)
BUN/CREAT SERPL: 14 (ref 12–20)
CALCIUM SERPL-MCNC: 8.8 MG/DL (ref 8.5–10.1)
CHLORIDE SERPL-SCNC: 107 MMOL/L (ref 97–108)
CHOLEST SERPL-MCNC: 196 MG/DL
CO2 SERPL-SCNC: 28 MMOL/L (ref 21–32)
CREAT SERPL-MCNC: 0.99 MG/DL (ref 0.55–1.02)
ERYTHROCYTE [DISTWIDTH] IN BLOOD BY AUTOMATED COUNT: 13.9 % (ref 11.5–14.5)
GLOBULIN SER CALC-MCNC: 2.9 G/DL (ref 2–4)
GLUCOSE SERPL-MCNC: 78 MG/DL (ref 65–100)
HCT VFR BLD AUTO: 40.1 % (ref 35–47)
HCV AB SER IA-ACNC: 0.14 INDEX
HCV AB SERPL QL IA: NONREACTIVE
HDLC SERPL-MCNC: 55 MG/DL
HDLC SERPL: 3.6 (ref 0–5)
HGB BLD-MCNC: 12.6 G/DL (ref 11.5–16)
LDLC SERPL CALC-MCNC: 128.6 MG/DL (ref 0–100)
MCH RBC QN AUTO: 26.4 PG (ref 26–34)
MCHC RBC AUTO-ENTMCNC: 31.4 G/DL (ref 30–36.5)
MCV RBC AUTO: 83.9 FL (ref 80–99)
NRBC # BLD: 0 K/UL (ref 0–0.01)
NRBC BLD-RTO: 0 PER 100 WBC
PLATELET # BLD AUTO: 247 K/UL (ref 150–400)
PMV BLD AUTO: 11.3 FL (ref 8.9–12.9)
POTASSIUM SERPL-SCNC: 4 MMOL/L (ref 3.5–5.1)
PROT SERPL-MCNC: 6.8 G/DL (ref 6.4–8.2)
RBC # BLD AUTO: 4.78 M/UL (ref 3.8–5.2)
SODIUM SERPL-SCNC: 138 MMOL/L (ref 136–145)
TRIGL SERPL-MCNC: 62 MG/DL
TSH SERPL DL<=0.05 MIU/L-ACNC: 2.33 UIU/ML (ref 0.36–3.74)
VLDLC SERPL CALC-MCNC: 12.4 MG/DL
WBC # BLD AUTO: 3.3 K/UL (ref 3.6–11)

## 2025-02-05 ENCOUNTER — TELEPHONE (OUTPATIENT)
Age: 45
End: 2025-02-05

## 2025-02-05 NOTE — TELEPHONE ENCOUNTER
Call and spoke to patient, two ID confirmed.  Patient informed writer that she went to Urgent Care and she has Covid and she was started on Paxlovid and she was also instructed on how to take her Buspar while on it.